# Patient Record
Sex: FEMALE | Race: BLACK OR AFRICAN AMERICAN | NOT HISPANIC OR LATINO
[De-identification: names, ages, dates, MRNs, and addresses within clinical notes are randomized per-mention and may not be internally consistent; named-entity substitution may affect disease eponyms.]

---

## 2020-09-29 PROBLEM — Z00.00 ENCOUNTER FOR PREVENTIVE HEALTH EXAMINATION: Status: ACTIVE | Noted: 2020-09-29

## 2020-09-30 ENCOUNTER — APPOINTMENT (OUTPATIENT)
Dept: ORTHOPEDIC SURGERY | Facility: CLINIC | Age: 40
End: 2020-09-30
Payer: COMMERCIAL

## 2020-09-30 VITALS — BODY MASS INDEX: 32.96 KG/M2 | HEIGHT: 67 IN | WEIGHT: 210 LBS

## 2020-09-30 DIAGNOSIS — M94.269 CHONDROMALACIA, UNSPECIFIED KNEE: ICD-10-CM

## 2020-09-30 PROCEDURE — 20610 DRAIN/INJ JOINT/BURSA W/O US: CPT | Mod: LT

## 2020-09-30 PROCEDURE — 99204 OFFICE O/P NEW MOD 45 MIN: CPT | Mod: 25

## 2020-09-30 PROCEDURE — 73562 X-RAY EXAM OF KNEE 3: CPT | Mod: LT

## 2020-09-30 NOTE — HISTORY OF PRESENT ILLNESS
[de-identified] : Location: Left knee\par Duration: July 2020\par Context: atraumatic\par Quality: sharp, constant\par Aggravating factors: walking, going downstairs worse than going upstairs\par Associated symptoms: popping/clicking, swelling\par Conservative treatment: NSAID, ice (only helps with swelling, not pain)\par Prior studies: saw an Ortho doctor 3 weeks ago but no x-rays - was given NSAID prescription

## 2020-09-30 NOTE — PROCEDURE
[de-identified] : Patient has demonstrated limited relief from NSAIDS, rest, exercises / PT, and after discussion of the risks and benefits, the patient has elected to proceed with a corticosteroid injection into the LEFT knee via an Anterolateral site.\par Confirmed that the patient does not have history of prior adverse reactions, active, infections, or relevant allergies.   There was no erythema or warmth, and the skin was clear.  The skin was sterilized with alcohol and via sterile technique, the knee was injected 3 cc of 1% xylocaine with 5 mg Kenalog.  The injection was completed without complication and a bandage was applied.  The patient tolerated the procedure well and was given post-injection instructions.\par

## 2020-09-30 NOTE — ASSESSMENT
[FreeTextEntry1] : Discussed at length with patient exam history and imaging.  Recommendation for home exercises and avoid hyperflexion in the past 90°.  Offered physical therapy but patient declines.  Patient elects cortisone injection today.  If no improvement in 5-6 weeks patient to follow up in office\par

## 2020-09-30 NOTE — PHYSICAL EXAM
[de-identified] : Left knee\par \par Constitutional: \par The patient is healthy-appearing and in no apparent distress. \par \par Gait:\par The patient ambulates with a normal gait and no limp.\par \par Cardiovascular System: \par The capillary refill is less than 2 seconds. \par \par Skin: \par There are no skin abnormalities.\par \par Left Knee: \par \par Bony Palpation: \par There is no tenderness of the medial joint line. \par There is no tenderness of the lateral joint line.\par There is no tenderness of the medial femoral chondyle.\par There is no tenderness of the lateral femoral chondyle.\par There is no tenderness of the tibial tubercle.\par There is no tenderness of the superior patella.\par There is no tenderness of the inferior patella.\par There is tenderness of the medial patellar facet.\par There is tenderness of the lateral patellar facet.\par \par Soft Tissue Palpation: \par There is no tenderness of the medial retinaculum.\par There is no tenderness of the lateral retinaculum.\par There is no tenderness of the quadriceps tendon.\par There is no tenderness of the patella tendon.\par There is no tenderness of the ITB.\par There is no tenderness of the pes anserine.\par \par Active Range of Motion: \par The range of motion at the knee actively and passively is full. \par \par Special Tests: \par There is a negative Apley.\par There is a negative Steinmanns. \par There is a negative Lachman and Anterior Drawer.\par There is a negative Posterior Drawer.  \par There is no varus or valgus laxity.\par \par Strength: \par There is 4/5 hip flexion and 5/5 knee flexion and extension.  \par \par Psychiatric: \par The patient demonstrates a normal mood and affect and is active and alert.\par  [de-identified] : X-ray left knee.  There is mild patellofemoral arthritis with trace medial compartment narrowing

## 2020-10-13 ENCOUNTER — APPOINTMENT (OUTPATIENT)
Dept: ORTHOPEDIC SURGERY | Facility: CLINIC | Age: 40
End: 2020-10-13

## 2020-10-29 ENCOUNTER — APPOINTMENT (OUTPATIENT)
Dept: ORTHOPEDIC SURGERY | Facility: CLINIC | Age: 40
End: 2020-10-29

## 2020-11-12 ENCOUNTER — RESULT REVIEW (OUTPATIENT)
Age: 40
End: 2020-11-12

## 2021-02-03 ENCOUNTER — APPOINTMENT (OUTPATIENT)
Dept: ORTHOPEDIC SURGERY | Facility: CLINIC | Age: 41
End: 2021-02-03

## 2021-02-10 ENCOUNTER — APPOINTMENT (OUTPATIENT)
Dept: ORTHOPEDIC SURGERY | Facility: CLINIC | Age: 41
End: 2021-02-10
Payer: COMMERCIAL

## 2021-02-10 PROCEDURE — 72050 X-RAY EXAM NECK SPINE 4/5VWS: CPT

## 2021-02-10 PROCEDURE — 99072 ADDL SUPL MATRL&STAF TM PHE: CPT

## 2021-02-10 PROCEDURE — 99214 OFFICE O/P EST MOD 30 MIN: CPT

## 2021-02-10 RX ORDER — DICLOFENAC SODIUM 75 MG/1
75 TABLET, DELAYED RELEASE ORAL
Qty: 60 | Refills: 0 | Status: ACTIVE | COMMUNITY
Start: 2021-02-10 | End: 1900-01-01

## 2021-02-10 NOTE — ASSESSMENT
[FreeTextEntry1] : 40 year old female with chronic neck pain and cervical radiculopathy. She has paresthesias in the upper extremities but is otherwise neurologically intact.  She has tried physical therapy, trigger point injections, medications and chiropractic care in the past without relief.  She has not had an MRI since 2019 and her symptoms have worsened.  She will be sent for a new MRI.  She was sent a prescription for Diclofenac.  She will send us a copy of her EMG/NCS.  She will followup once her MRI has been completed.  She knows to call with any questions or concerns or if her symptoms acutely worsen.

## 2021-02-10 NOTE — HISTORY OF PRESENT ILLNESS
[de-identified] : 40 year old female presents with chronic neck pain radiating into her upper extremities left worse than right.  She reports paresthesias into the upper extremities as well.  She has tried multiple treatments for her symptoms.  She has been doing physical therapy without relief.  She has tried trigger point injections the last of which were in Dec 2020 and only provided a few days of relief.  She has tried chiropractic care.  She has tried numerous medications however she does not have a list of these medications and cannot recall the names.  She also had a NCS/EMG within the last year.  She says the results of that study were normal but she does not have the records.  She had a MRI in 2019.  She denies fine motor deficits, recent illness, fevers, weakness, balance problems, saddle anesthesia, urinary retention or fecal incontinence.

## 2021-02-10 NOTE — PHYSICAL EXAM
[de-identified] : General: No acute distress, conversant, well-nourished.\par Head: Normocephalic, atraumatic\par Neck: trachea midline, FROM\par Heart: normotensive and normal rate and rhythm\par Lungs: No labored breathing\par Skin: No abrasions, no rashes, no edema\par Psych: Alert and oriented to person, place and time\par Extremities: no peripheral edema or digital cyanosis\par Gait: Normal gait. Can perform tandem gait.  \par Vascular: warm and well perfused distally, palpable distal pulses\par \par MSK:\par Cervical Spine: \par + tenderness to palpation along posterior neck.  No step-off, no deformity.\par \par NEURO:\par Sensation \par          Left           \par C5     2/2               \par C6     2/2               \par C7     2/2               \par C8     2/2              \par T1     2/2             \par \par          Right         \par C5     2/2               \par C6     2/2               \par C7     2/2               \par C8     2/2              \par T1     2/2      \par \par Motor: \par                                                Left             \par C5 (deltoid abduction)             5/5               \par C6 (biceps flexion)                   5/5                \par C7 (triceps extension)             5/5               \par C8 (finger flexion)                     5/5               \par T1 (interosseous)                     5/5           \par \par                                                Right           \par C5 (deltoid abduction)             5/5               \par C6 (biceps flexion)                   5/5                \par C7 (triceps extension)             5/5               \par C8 (finger flexion)                     5/5               \par T1 (interosseous)                     5/5                     \par \par Sensation \par Left L2  -  2/2            \par Left L3  -  2/2\par Left L4  -  2/2\par Left L5  -  2/2\par Left S1  -  2/2\par \par Right L2  -  2/2            \par Right L3  -  2/2\par Right L4  -  2/2\par Right L5  -  2/2\par Right S1  -  2/2\par \par Motor: \par Left L2 (hip flexion)                            5/5                \par Left L3 (knee extension)                   5/5                \par Left L4 (ankle dorsiflexion)                 5/5                \par Left L5 (long toe extensor)                5/5                \par Left S1 (ankle plantar flexion)           5/5\par \par Right L2 (hip flexion)                            5/5                \par Right L3 (knee extension)                   5/5                \par Right L4 (ankle dorsiflexion)                 5/5                \par Right L5 (long toe extensor)                5/5                \par Right S1 (ankle plantar flexion)           5/5\par \par Reflexes: Normal and symmetric\par Negative Spurling’s test.  Negative Victor’s reflex.  \par Negative clonus.  Down-going Babinski. [de-identified] : Cervical AP, lateral, flexion and extension radiographs obtained in the office today shows no fracture or dislocation.  Cervical spondylosis.  Severe loss of disc height at C5-C6 with anterior osteophyte formation.  No instability on dynamic series. \par \par Cervical MRI (4/10/19): Central C5-6 disc herniation with extrusion resulting in moderate central stenosis. Central C7 disc herniation resulting in mild central stenosis.  Bulging C3-4 and C4-5 resulting in mild central stenosis. Evidence for muscle spasm with straightening and slight reversal of the normal cervical lordosis.

## 2021-02-10 NOTE — CONSULT LETTER
[Dear  ___] : Dear  [unfilled], [Consult Letter:] : I had the pleasure of evaluating your patient, [unfilled]. [Please see my note below.] : Please see my note below. [Sincerely,] : Sincerely, [FreeTextEntry3] : Lázaro June MD\par Spine Surgeon\par  of Orthopedics\par [facility]\par \par Office: [address] [place] [address] [address] [place] [address]\par Phone: [phone]\par Fax: [phone]

## 2021-02-18 ENCOUNTER — APPOINTMENT (OUTPATIENT)
Dept: ORTHOPEDIC SURGERY | Facility: CLINIC | Age: 41
End: 2021-02-18
Payer: COMMERCIAL

## 2021-02-18 VITALS — TEMPERATURE: 99.3 F

## 2021-02-18 PROCEDURE — 99072 ADDL SUPL MATRL&STAF TM PHE: CPT

## 2021-02-18 PROCEDURE — 99214 OFFICE O/P EST MOD 30 MIN: CPT

## 2021-02-18 NOTE — PHYSICAL EXAM
[de-identified] : General: No acute distress, conversant, well-nourished.\par Head: Normocephalic, atraumatic\par Neck: trachea midline, FROM\par Heart: normotensive and normal rate and rhythm\par Lungs: No labored breathing\par Skin: No abrasions, no rashes, no edema\par Psych: Alert and oriented to person, place and time\par Extremities: no peripheral edema or digital cyanosis\par Gait: Normal gait. Can perform tandem gait.  \par Vascular: warm and well perfused distally, palpable distal pulses\par \par MSK:\par Cervical Spine: \par + tenderness to palpation along posterior neck.  No step-off, no deformity.\par \par NEURO:\par Sensation \par          Left           \par C5     2/2               \par C6     2/2               \par C7     2/2               \par C8     2/2              \par T1     2/2             \par \par          Right         \par C5     2/2               \par C6     2/2               \par C7     2/2               \par C8     2/2              \par T1     2/2      \par \par Motor: \par                                                Left             \par C5 (deltoid abduction)             5/5               \par C6 (biceps flexion)                   5/5                \par C7 (triceps extension)             5/5               \par C8 (finger flexion)                     5/5               \par T1 (interosseous)                     5/5           \par \par                                                Right           \par C5 (deltoid abduction)             5/5               \par C6 (biceps flexion)                   5/5                \par C7 (triceps extension)             5/5               \par C8 (finger flexion)                     5/5               \par T1 (interosseous)                     5/5                     \par \par Sensation \par Left L2  -  2/2            \par Left L3  -  2/2\par Left L4  -  2/2\par Left L5  -  2/2\par Left S1  -  2/2\par \par Right L2  -  2/2            \par Right L3  -  2/2\par Right L4  -  2/2\par Right L5  -  2/2\par Right S1  -  2/2\par \par Motor: \par Left L2 (hip flexion)                            5/5                \par Left L3 (knee extension)                   5/5                \par Left L4 (ankle dorsiflexion)                 5/5                \par Left L5 (long toe extensor)                5/5                \par Left S1 (ankle plantar flexion)           5/5\par \par Right L2 (hip flexion)                            5/5                \par Right L3 (knee extension)                   5/5                \par Right L4 (ankle dorsiflexion)                 5/5                \par Right L5 (long toe extensor)                5/5                \par Right S1 (ankle plantar flexion)           5/5\par \par Reflexes: Normal and symmetric\par Negative Spurling’s test.  Negative Victor’s reflex.  \par Negative clonus.  Down-going Babinski. [de-identified] : Cervical MRI (2/17/21): Large extruded central disc herniation at C5-6 with moderate cord compression. Disc herniation is increased in size since prior study. Interval development of small central disc herniation at C4-5 which abuts the anterior cord margin. Small central disc herniation C6-7 is unchanged. Mild annular bulge at C3-4 is unchanged.\par \par Cervical AP, lateral, flexion and extension radiographs (Feb 10, 2021) no fracture or dislocation.  Cervical spondylosis.  Severe loss of disc height at C5-C6 with anterior osteophyte formation.  No instability on dynamic series. \par \par Cervical MRI (4/10/19): Central C5-6 disc herniation with extrusion resulting in moderate central stenosis. Central C7 disc herniation resulting in mild central stenosis.  Bulging C3-4 and C4-5 resulting in mild central stenosis. Evidence for muscle spasm with straightening and slight reversal of the normal cervical lordosis.

## 2021-02-18 NOTE — HISTORY OF PRESENT ILLNESS
[de-identified] : 40 year old female returns for followup with chronic neck pain and cervical radiculopathy.  Her radicular symptoms are worse on the left with the pain radiating down to her lateral forearm and hand.  The right radicular pain goes to the shoulder and lateral forearm.  She continues to have paresthesias into the upper extremities. She says the Diclofenac only provides minimal relief.  She has been dong physical therapy without relief.  She is here to review her recent MRI.  She denies fine motor deficits, recent illness, fevers, weakness, balance problems, saddle anesthesia, urinary retention or fecal incontinence.

## 2021-02-18 NOTE — CONSULT LETTER
[Dear  ___] : Dear  [unfilled], [Consult Letter:] : I had the pleasure of evaluating your patient, [unfilled]. [Please see my note below.] : Please see my note below. [Sincerely,] : Sincerely, [FreeTextEntry3] : Lázaro June MD\par Spine Surgeon\par  of Orthopedics\par [facility] / [facility]\par \par Office: [address] [place] [address] [address] [place] [address]\par Phone: [phone]\par Fax: [phone]

## 2021-02-24 ENCOUNTER — NON-APPOINTMENT (OUTPATIENT)
Age: 41
End: 2021-02-24

## 2021-03-11 ENCOUNTER — APPOINTMENT (OUTPATIENT)
Dept: ORTHOPEDIC SURGERY | Facility: CLINIC | Age: 41
End: 2021-03-11
Payer: COMMERCIAL

## 2021-03-11 PROCEDURE — 99213 OFFICE O/P EST LOW 20 MIN: CPT | Mod: 95

## 2021-03-12 RX ORDER — POVIDONE-IODINE 5 %
1 AEROSOL (ML) TOPICAL ONCE
Refills: 0 | Status: COMPLETED | OUTPATIENT
Start: 2021-03-15 | End: 2021-03-15

## 2021-03-12 RX ORDER — INFLUENZA VIRUS VACCINE 15; 15; 15; 15 UG/.5ML; UG/.5ML; UG/.5ML; UG/.5ML
0.5 SUSPENSION INTRAMUSCULAR ONCE
Refills: 0 | Status: DISCONTINUED | OUTPATIENT
Start: 2021-03-15 | End: 2021-03-18

## 2021-03-12 NOTE — H&P ADULT - NSHPLABSRESULTS_GEN_ALL_CORE
3M DOS  Covid Swab pending 3M DOS  Covid Swab pending  preop cbc/cmp/pt/inr/ptt - within normal limits, reviewed by medical clearance   ekg - day of surgery   Cr. 0.76 3M DOS  preop cbc/cmp/pt/inr/ptt - within normal limits, reviewed by medical clearance   ekg - day of surgery if requested by anesthesia, ordered  Cr. 0.76  UA WNL

## 2021-03-12 NOTE — H&P ADULT - NSHPPHYSICALEXAM_GEN_ALL_CORE
MSK: + decreased ROM 2/2 pain, cervical spine     Remainder of exam per medical clearance note MSK: + decreased ROM 2/2 pain, cervical spine   MSK: AIN/PIN/U nerves intact to motor BUE. Wrist flex/ext intact BUE. Sensation intact to M/R/U/Msk/Ax nerves and equal BUE. Cap refill brisk. Skin warm and well perfused. Radial pulses palpable.  strength 5/5 BUE.  Remainder of exam per medical clearance note

## 2021-03-12 NOTE — H&P ADULT - PROBLEM SELECTOR PLAN 1
Admit to Orthopaedic Service.  Presents today for elective ACDF C5-C6  Pt medically stable and cleared for procedure today by  Admit to Orthopaedic Service.  Presents today for elective ACDF C5-C6  Pt medically stable and cleared for procedure today by Dr. Pandey

## 2021-03-12 NOTE — H&P ADULT - HISTORY OF PRESENT ILLNESS
41F c/o neck pain x       Present for elective ACF C5-C6  41F c/o neck pain x years without accident or injury to bring on pain. Pt endorses pain in arms R>L and associated tingling and pins and needles to L>R arm and sharp pain down LLE intermittently. Pain persists despite conservative measures. Pt is LHD. Ambulates with no assistance. Notes she at times has BLE tingling that was once relieved with a lumbar spine BRANDON but has worn off.  Denies recent illness, weakness, bowel or bladder changes.  Present for elective ACF C5-C6

## 2021-03-13 NOTE — PHYSICAL EXAM
[de-identified] : General: NAD AAOx4, well-appearing\par Respiratory: No visible respiratory distress\par Psych: Good mood and appropriate affect\par Skin: WWP, no rashes\par Gait: Normal gait, can do tandem gait\par MSK:\par Cervical: no visible deformity in coronal or sagittal planes. Patient indicates pain radiating from neck to bilateral upper extremities down to hands\par Neuro: patient describes numbness and paresthesias in her hands. Grossly moving all extremities [de-identified] : Cervical MRI (2/17/21): Large extruded central disc herniation at C5-6 with moderate cord compression. Disc herniation is increased in size since prior study. Interval development of small central disc herniation at C4-5 which abuts the anterior cord margin. Small central disc herniation C6-7 is unchanged. Mild annular bulge at C3-4 is unchanged.\par \par Cervical AP, lateral, flexion and extension radiographs (Feb 10, 2021) no fracture or dislocation.  Cervical spondylosis.  Severe loss of disc height at C5-C6 with anterior osteophyte formation.  No instability on dynamic series. \par \par Cervical MRI (4/10/19): Central C5-6 disc herniation with extrusion resulting in moderate central stenosis. Central C7 disc herniation resulting in mild central stenosis.  Bulging C3-4 and C4-5 resulting in mild central stenosis. Evidence for muscle spasm with straightening and slight reversal of the normal cervical lordosis.

## 2021-03-13 NOTE — HISTORY OF PRESENT ILLNESS
[de-identified] : This visit was provided by the [facility] telemedicine service.  This telehealth appointment was conducted using real-time 2-way audiovisual technology.  The patient Selina Montano was at her home during the time of the visit. The provider, Lázaro June was located at his office at [address] [place] [address] at the time of the visit.  The patient and Lázaro June participated in the telehealth encounter.  Verbal consent was given on March 11, 2021 by the patient.\par \par HPI:\par Telehealth via [facility] tele[facility] service: 20 minutes\par \par 41 year old female returns for followup with cervical radiculopathy secondary to C5-C6 disc herniation with cord compression.  Her symptoms remain unchanged since her last visit.  The pain radiates to her lateral forearm and hand left worse than right.  She has paresthesias.  She is here to review information for her upcoming scheduled ACDF C5-C6 next week.  She denies recent illness, fevers, weakness, balance problems, saddle anesthesia, urinary retention or fecal incontinence.\par

## 2021-03-13 NOTE — ASSESSMENT
[FreeTextEntry1] : 41 year old female with cervical radiculopathy secondary to C5-C6 disc herniation causing moderate spinal cord compression.  Her symptoms have remained unchanged depite conservative treatment.  We reviewed information regarding her upcoming surgery scheduled for next week: ACDF C5-C6.  We discussed the risks and benefits of surgery.  The risks include anesthesia, blood loss, need for blood transfusion, clots, stroke, myocardial infarct, chronic pain, loss of function, need for reoperation, hardware failure, nonunion, infection, durotomy, damage to neurovascular structures, and Covid-19. The patient understands the risks and elects to proceed with surgical intervention.  The patient asked excellent questions which were answered.  She is planned for surgery next week.  She will require a Covid19 test prior to surgery.  She knows to call with any questions or concerns or if her symptoms acutely worsen.

## 2021-03-14 ENCOUNTER — TRANSCRIPTION ENCOUNTER (OUTPATIENT)
Age: 41
End: 2021-03-14

## 2021-03-15 ENCOUNTER — APPOINTMENT (OUTPATIENT)
Dept: ORTHOPEDIC SURGERY | Facility: HOSPITAL | Age: 41
End: 2021-03-15

## 2021-03-15 ENCOUNTER — INPATIENT (INPATIENT)
Facility: HOSPITAL | Age: 41
LOS: 2 days | Discharge: HOME | DRG: 472 | End: 2021-03-18
Attending: STUDENT IN AN ORGANIZED HEALTH CARE EDUCATION/TRAINING PROGRAM | Admitting: STUDENT IN AN ORGANIZED HEALTH CARE EDUCATION/TRAINING PROGRAM
Payer: COMMERCIAL

## 2021-03-15 VITALS
SYSTOLIC BLOOD PRESSURE: 120 MMHG | HEART RATE: 78 BPM | HEIGHT: 67 IN | WEIGHT: 218.48 LBS | RESPIRATION RATE: 16 BRPM | DIASTOLIC BLOOD PRESSURE: 80 MMHG | TEMPERATURE: 98 F

## 2021-03-15 DIAGNOSIS — M54.12 RADICULOPATHY, CERVICAL REGION: ICD-10-CM

## 2021-03-15 DIAGNOSIS — Z98.890 OTHER SPECIFIED POSTPROCEDURAL STATES: Chronic | ICD-10-CM

## 2021-03-15 LAB
BLD GP AB SCN SERPL QL: NEGATIVE — SIGNIFICANT CHANGE UP
RH IG SCN BLD-IMP: POSITIVE — SIGNIFICANT CHANGE UP

## 2021-03-15 PROCEDURE — 22845 INSERT SPINE FIXATION DEVICE: CPT | Mod: 59

## 2021-03-15 PROCEDURE — 22853 INSJ BIOMECHANICAL DEVICE: CPT

## 2021-03-15 PROCEDURE — 22551 ARTHRD ANT NTRBDY CERVICAL: CPT

## 2021-03-15 PROCEDURE — 20937 SP BONE AGRFT MORSEL ADD-ON: CPT

## 2021-03-15 RX ORDER — SENNA PLUS 8.6 MG/1
2 TABLET ORAL AT BEDTIME
Refills: 0 | Status: DISCONTINUED | OUTPATIENT
Start: 2021-03-15 | End: 2021-03-18

## 2021-03-15 RX ORDER — ONDANSETRON 8 MG/1
4 TABLET, FILM COATED ORAL EVERY 6 HOURS
Refills: 0 | Status: DISCONTINUED | OUTPATIENT
Start: 2021-03-15 | End: 2021-03-18

## 2021-03-15 RX ORDER — PREGABALIN 225 MG/1
0 CAPSULE ORAL
Qty: 0 | Refills: 0 | DISCHARGE

## 2021-03-15 RX ORDER — OXYCODONE HYDROCHLORIDE 5 MG/1
5 TABLET ORAL EVERY 4 HOURS
Refills: 0 | Status: DISCONTINUED | OUTPATIENT
Start: 2021-03-15 | End: 2021-03-18

## 2021-03-15 RX ORDER — TRAMADOL HYDROCHLORIDE 50 MG/1
0 TABLET ORAL
Qty: 0 | Refills: 0 | DISCHARGE

## 2021-03-15 RX ORDER — CHLORHEXIDINE GLUCONATE 213 G/1000ML
1 SOLUTION TOPICAL ONCE
Refills: 0 | Status: COMPLETED | OUTPATIENT
Start: 2021-03-15 | End: 2021-03-15

## 2021-03-15 RX ORDER — CEFAZOLIN SODIUM 1 G
2000 VIAL (EA) INJECTION EVERY 8 HOURS
Refills: 0 | Status: COMPLETED | OUTPATIENT
Start: 2021-03-15 | End: 2021-03-16

## 2021-03-15 RX ORDER — SODIUM CHLORIDE 9 MG/ML
1000 INJECTION, SOLUTION INTRAVENOUS
Refills: 0 | Status: DISCONTINUED | OUTPATIENT
Start: 2021-03-15 | End: 2021-03-18

## 2021-03-15 RX ORDER — GABAPENTIN 400 MG/1
300 CAPSULE ORAL ONCE
Refills: 0 | Status: COMPLETED | OUTPATIENT
Start: 2021-03-15 | End: 2021-03-15

## 2021-03-15 RX ORDER — MAGNESIUM HYDROXIDE 400 MG/1
30 TABLET, CHEWABLE ORAL EVERY 12 HOURS
Refills: 0 | Status: DISCONTINUED | OUTPATIENT
Start: 2021-03-15 | End: 2021-03-18

## 2021-03-15 RX ORDER — ACETAMINOPHEN 500 MG
1000 TABLET ORAL EVERY 8 HOURS
Refills: 0 | Status: DISCONTINUED | OUTPATIENT
Start: 2021-03-15 | End: 2021-03-18

## 2021-03-15 RX ORDER — METHOCARBAMOL 500 MG/1
500 TABLET, FILM COATED ORAL EVERY 8 HOURS
Refills: 0 | Status: DISCONTINUED | OUTPATIENT
Start: 2021-03-15 | End: 2021-03-18

## 2021-03-15 RX ORDER — HYDROMORPHONE HYDROCHLORIDE 2 MG/ML
0.5 INJECTION INTRAMUSCULAR; INTRAVENOUS; SUBCUTANEOUS
Refills: 0 | Status: DISCONTINUED | OUTPATIENT
Start: 2021-03-15 | End: 2021-03-18

## 2021-03-15 RX ORDER — CHOLECALCIFEROL (VITAMIN D3) 125 MCG
0 CAPSULE ORAL
Qty: 0 | Refills: 0 | DISCHARGE

## 2021-03-15 RX ORDER — METOCLOPRAMIDE HCL 10 MG
10 TABLET ORAL EVERY 8 HOURS
Refills: 0 | Status: DISCONTINUED | OUTPATIENT
Start: 2021-03-15 | End: 2021-03-18

## 2021-03-15 RX ORDER — ACETAMINOPHEN 500 MG
1000 TABLET ORAL ONCE
Refills: 0 | Status: COMPLETED | OUTPATIENT
Start: 2021-03-15 | End: 2021-03-15

## 2021-03-15 RX ORDER — APREPITANT 80 MG/1
40 CAPSULE ORAL ONCE
Refills: 0 | Status: COMPLETED | OUTPATIENT
Start: 2021-03-15 | End: 2021-03-15

## 2021-03-15 RX ORDER — BENZOCAINE AND MENTHOL 5; 1 G/100ML; G/100ML
1 LIQUID ORAL EVERY 6 HOURS
Refills: 0 | Status: DISCONTINUED | OUTPATIENT
Start: 2021-03-15 | End: 2021-03-18

## 2021-03-15 RX ADMIN — Medication 1000 MILLIGRAM(S): at 22:30

## 2021-03-15 RX ADMIN — Medication 1000 MILLIGRAM(S): at 10:37

## 2021-03-15 RX ADMIN — APREPITANT 40 MILLIGRAM(S): 80 CAPSULE ORAL at 10:37

## 2021-03-15 RX ADMIN — SENNA PLUS 2 TABLET(S): 8.6 TABLET ORAL at 21:38

## 2021-03-15 RX ADMIN — CHLORHEXIDINE GLUCONATE 1 APPLICATION(S): 213 SOLUTION TOPICAL at 10:47

## 2021-03-15 RX ADMIN — Medication 1000 MILLIGRAM(S): at 21:37

## 2021-03-15 RX ADMIN — METHOCARBAMOL 500 MILLIGRAM(S): 500 TABLET, FILM COATED ORAL at 21:38

## 2021-03-15 RX ADMIN — GABAPENTIN 300 MILLIGRAM(S): 400 CAPSULE ORAL at 10:37

## 2021-03-15 RX ADMIN — Medication 100 MILLIGRAM(S): at 19:48

## 2021-03-15 RX ADMIN — BENZOCAINE AND MENTHOL 1 LOZENGE: 5; 1 LIQUID ORAL at 19:49

## 2021-03-15 RX ADMIN — Medication 1 APPLICATION(S): at 10:40

## 2021-03-15 RX ADMIN — ONDANSETRON 4 MILLIGRAM(S): 8 TABLET, FILM COATED ORAL at 19:48

## 2021-03-15 RX ADMIN — Medication 50 MILLIGRAM(S): at 21:38

## 2021-03-15 NOTE — BRIEF OPERATIVE NOTE - NSICDXBRIEFPREOP_GEN_ALL_CORE_FT
PRE-OP DIAGNOSIS:  Cervical radiculopathy 15-Mar-2021 15:15:34 Cervical radiculopathy Caro Alvarez

## 2021-03-15 NOTE — PROGRESS NOTE ADULT - SUBJECTIVE AND OBJECTIVE BOX
Orthopaedics Post Op Check    Procedure:  ACDF C5-C6 with ICBG  Surgeon: Dr. June     Pt comfortable, without complaints  Denies CP, SOB, N/V, numbness/tingling     Vital Signs Last 24 Hrs  T(C): 36.1 (15 Mar 2021 15:10), Max: 36.6 (15 Mar 2021 09:57)  T(F): 97 (15 Mar 2021 15:10), Max: 97.9 (15 Mar 2021 09:57)  HR: 71 (15 Mar 2021 15:55) (68 - 82)  BP: 159/89 (15 Mar 2021 15:55) (120/80 - 168/85)  BP(mean): 119 (15 Mar 2021 15:55) (116 - 121)  RR: 15 (15 Mar 2021 15:55) (11 - 16)  SpO2: 100% (15 Mar 2021 15:55) (100% - 100%)  AVSS, NAD    Dressing C/D/I; hard collar in place, JON x 1   General: Pt Alert and oriented     Sensation intact to bilateral UE distally. Motor Strength 5/5 to /interossei bilaterally. AIN/PIN intact.   Skin warm and well perfused   Radial pulses 2+         Post op XR: fluoroscopy utilized intra op to confirm level     A/P: 41yFemale POD#0 s/p ACDF C5-C6, ICBG  - Stable  - Pain Control  - DVT ppx: SCDs  - Post op abx: Ancef  - PT, WBS: WBAT  - F/U AM Labs

## 2021-03-15 NOTE — BRIEF OPERATIVE NOTE - NSICDXBRIEFPROCEDURE_GEN_ALL_CORE_FT
PROCEDURES:  Bone graft of left iliac crest 15-Mar-2021 15:17:14  Caro Alvarez  Anterior cervical discectomy and fusion (ACDF) 15-Mar-2021 15:16:04  Caro Alvarez

## 2021-03-15 NOTE — BRIEF OPERATIVE NOTE - NSICDXBRIEFPOSTOP_GEN_ALL_CORE_FT
POST-OP DIAGNOSIS:  Cervical radiculopathy 15-Mar-2021 15:15:46 Cervical radiculopathy Caro Alvarez

## 2021-03-15 NOTE — PRE-OP CHECKLIST - SKIN PREP
October 9, 2020     Finesse Aguirre                                                                                          [address] [facility] [id number]        Dear Shoam Kilpatrick wrote a referral for you to see a gastroenterologist  specialist. Our department has not been able to reach you by phone.    Your health is important to us.  If you are still interested in receiving services with a gastroenterologist  specialist, please give us a call to schedule your appointment at [phone].     Please know as we continue to manage COVID-19, your health and safety is our highest priority. The [facility] provides additional measures to protect you and our team members. In person or online, we're here for you.    We look forward to providing you with the best care possible.     Sincerely,    Majo Jerome MD    [facility] [address] [facility] [address]  Dept Phone: [phone]       done

## 2021-03-16 ENCOUNTER — TRANSCRIPTION ENCOUNTER (OUTPATIENT)
Age: 41
End: 2021-03-16

## 2021-03-16 LAB
ANION GAP SERPL CALC-SCNC: 9 MMOL/L — SIGNIFICANT CHANGE UP (ref 5–17)
BASOPHILS # BLD AUTO: 0.01 K/UL — SIGNIFICANT CHANGE UP (ref 0–0.2)
BASOPHILS NFR BLD AUTO: 0.1 % — SIGNIFICANT CHANGE UP (ref 0–2)
BUN SERPL-MCNC: 7 MG/DL — SIGNIFICANT CHANGE UP (ref 7–23)
CALCIUM SERPL-MCNC: 8.7 MG/DL — SIGNIFICANT CHANGE UP (ref 8.4–10.5)
CHLORIDE SERPL-SCNC: 107 MMOL/L — SIGNIFICANT CHANGE UP (ref 96–108)
CO2 SERPL-SCNC: 26 MMOL/L — SIGNIFICANT CHANGE UP (ref 22–31)
CREAT SERPL-MCNC: 0.78 MG/DL — SIGNIFICANT CHANGE UP (ref 0.5–1.3)
EOSINOPHIL # BLD AUTO: 0 K/UL — SIGNIFICANT CHANGE UP (ref 0–0.5)
EOSINOPHIL NFR BLD AUTO: 0 % — SIGNIFICANT CHANGE UP (ref 0–6)
GLUCOSE SERPL-MCNC: 102 MG/DL — HIGH (ref 70–99)
HCT VFR BLD CALC: 34.9 % — SIGNIFICANT CHANGE UP (ref 34.5–45)
HGB BLD-MCNC: 11 G/DL — LOW (ref 11.5–15.5)
IMM GRANULOCYTES NFR BLD AUTO: 0.3 % — SIGNIFICANT CHANGE UP (ref 0–1.5)
LYMPHOCYTES # BLD AUTO: 1.15 K/UL — SIGNIFICANT CHANGE UP (ref 1–3.3)
LYMPHOCYTES # BLD AUTO: 12.7 % — LOW (ref 13–44)
MCHC RBC-ENTMCNC: 28.4 PG — SIGNIFICANT CHANGE UP (ref 27–34)
MCHC RBC-ENTMCNC: 31.5 GM/DL — LOW (ref 32–36)
MCV RBC AUTO: 90.2 FL — SIGNIFICANT CHANGE UP (ref 80–100)
MONOCYTES # BLD AUTO: 0.49 K/UL — SIGNIFICANT CHANGE UP (ref 0–0.9)
MONOCYTES NFR BLD AUTO: 5.4 % — SIGNIFICANT CHANGE UP (ref 2–14)
NEUTROPHILS # BLD AUTO: 7.41 K/UL — HIGH (ref 1.8–7.4)
NEUTROPHILS NFR BLD AUTO: 81.5 % — HIGH (ref 43–77)
NRBC # BLD: 0 /100 WBCS — SIGNIFICANT CHANGE UP (ref 0–0)
PLATELET # BLD AUTO: 301 K/UL — SIGNIFICANT CHANGE UP (ref 150–400)
POTASSIUM SERPL-MCNC: 4.4 MMOL/L — SIGNIFICANT CHANGE UP (ref 3.5–5.3)
POTASSIUM SERPL-SCNC: 4.4 MMOL/L — SIGNIFICANT CHANGE UP (ref 3.5–5.3)
RBC # BLD: 3.87 M/UL — SIGNIFICANT CHANGE UP (ref 3.8–5.2)
RBC # FLD: 15.1 % — HIGH (ref 10.3–14.5)
SODIUM SERPL-SCNC: 142 MMOL/L — SIGNIFICANT CHANGE UP (ref 135–145)
WBC # BLD: 9.09 K/UL — SIGNIFICANT CHANGE UP (ref 3.8–10.5)
WBC # FLD AUTO: 9.09 K/UL — SIGNIFICANT CHANGE UP (ref 3.8–10.5)

## 2021-03-16 PROCEDURE — 22845 INSERT SPINE FIXATION DEVICE: CPT | Mod: AS,59

## 2021-03-16 PROCEDURE — 20937 SP BONE AGRFT MORSEL ADD-ON: CPT | Mod: AS

## 2021-03-16 PROCEDURE — 22853 INSJ BIOMECHANICAL DEVICE: CPT | Mod: AS

## 2021-03-16 PROCEDURE — 72040 X-RAY EXAM NECK SPINE 2-3 VW: CPT | Mod: 26

## 2021-03-16 PROCEDURE — 22551 ARTHRD ANT NTRBDY CERVICAL: CPT | Mod: AS

## 2021-03-16 RX ORDER — SODIUM CHLORIDE 9 MG/ML
500 INJECTION INTRAMUSCULAR; INTRAVENOUS; SUBCUTANEOUS ONCE
Refills: 0 | Status: DISCONTINUED | OUTPATIENT
Start: 2021-03-16 | End: 2021-03-16

## 2021-03-16 RX ORDER — SODIUM CHLORIDE 9 MG/ML
500 INJECTION INTRAMUSCULAR; INTRAVENOUS; SUBCUTANEOUS ONCE
Refills: 0 | Status: COMPLETED | OUTPATIENT
Start: 2021-03-16 | End: 2021-03-16

## 2021-03-16 RX ORDER — DEXAMETHASONE 0.5 MG/5ML
8 ELIXIR ORAL EVERY 8 HOURS
Refills: 0 | Status: COMPLETED | OUTPATIENT
Start: 2021-03-16 | End: 2021-03-17

## 2021-03-16 RX ADMIN — Medication 100 MILLIGRAM(S): at 06:28

## 2021-03-16 RX ADMIN — SODIUM CHLORIDE 100 MILLILITER(S): 9 INJECTION, SOLUTION INTRAVENOUS at 11:14

## 2021-03-16 RX ADMIN — SODIUM CHLORIDE 500 MILLILITER(S): 9 INJECTION INTRAMUSCULAR; INTRAVENOUS; SUBCUTANEOUS at 12:35

## 2021-03-16 RX ADMIN — Medication 101.6 MILLIGRAM(S): at 13:50

## 2021-03-16 RX ADMIN — SENNA PLUS 2 TABLET(S): 8.6 TABLET ORAL at 21:44

## 2021-03-16 RX ADMIN — Medication 1000 MILLIGRAM(S): at 06:43

## 2021-03-16 RX ADMIN — OXYCODONE HYDROCHLORIDE 5 MILLIGRAM(S): 5 TABLET ORAL at 11:14

## 2021-03-16 RX ADMIN — Medication 1000 MILLIGRAM(S): at 06:15

## 2021-03-16 RX ADMIN — Medication 1000 MILLIGRAM(S): at 21:43

## 2021-03-16 RX ADMIN — Medication 50 MILLIGRAM(S): at 06:15

## 2021-03-16 RX ADMIN — METHOCARBAMOL 500 MILLIGRAM(S): 500 TABLET, FILM COATED ORAL at 13:52

## 2021-03-16 RX ADMIN — ONDANSETRON 4 MILLIGRAM(S): 8 TABLET, FILM COATED ORAL at 00:56

## 2021-03-16 RX ADMIN — Medication 101.6 MILLIGRAM(S): at 21:28

## 2021-03-16 RX ADMIN — Medication 50 MILLIGRAM(S): at 21:44

## 2021-03-16 RX ADMIN — Medication 1000 MILLIGRAM(S): at 22:43

## 2021-03-16 RX ADMIN — METHOCARBAMOL 500 MILLIGRAM(S): 500 TABLET, FILM COATED ORAL at 21:44

## 2021-03-16 RX ADMIN — ONDANSETRON 4 MILLIGRAM(S): 8 TABLET, FILM COATED ORAL at 17:37

## 2021-03-16 RX ADMIN — BENZOCAINE AND MENTHOL 1 LOZENGE: 5; 1 LIQUID ORAL at 09:44

## 2021-03-16 RX ADMIN — Medication 1000 MILLIGRAM(S): at 13:52

## 2021-03-16 RX ADMIN — Medication 1000 MILLIGRAM(S): at 14:52

## 2021-03-16 RX ADMIN — METHOCARBAMOL 500 MILLIGRAM(S): 500 TABLET, FILM COATED ORAL at 06:15

## 2021-03-16 RX ADMIN — Medication 50 MILLIGRAM(S): at 13:52

## 2021-03-16 RX ADMIN — ONDANSETRON 4 MILLIGRAM(S): 8 TABLET, FILM COATED ORAL at 11:14

## 2021-03-16 RX ADMIN — OXYCODONE HYDROCHLORIDE 5 MILLIGRAM(S): 5 TABLET ORAL at 12:14

## 2021-03-16 RX ADMIN — ONDANSETRON 4 MILLIGRAM(S): 8 TABLET, FILM COATED ORAL at 06:15

## 2021-03-16 NOTE — PROGRESS NOTE ADULT - SUBJECTIVE AND OBJECTIVE BOX
Orthopaedic Surgery Progress Note    Post-operative day #1 s/p ACDF C5-C6    Subjective:     Patient seen and examined. She just finished OT and felt very dizzy. She is having some dysphagia. Denies chest pain, shortness of breath, nausea/vomiting, numbness/tingling.    Objective:    Vital Signs Last 24 Hrs  T(C): 36.2 (03-16-21 @ 09:15), Max: 36.9 (03-16-21 @ 06:29)  T(F): 97.1 (03-16-21 @ 09:15), Max: 98.5 (03-16-21 @ 06:29)  HR: 70 (03-16-21 @ 09:15) (70 - 82)  BP: 124/76 (03-16-21 @ 09:15) (124/76 - 133/63)  BP(mean): 90 (03-16-21 @ 06:29) (90 - 90)  RR: 16 (03-16-21 @ 09:15) (15 - 16)  SpO2: 100% (03-16-21 @ 09:15) (100% - 100%)  AVSS    PE:  General: Patient alert and oriented, NAD  Dressing: Clean/dry/intact anterior cervical and left hip   Pulses: 2+ radial pulse BUE   Sensation: SILT BUE   Motor: /biceps/triceps 5/5 BUE                           11.0   9.09  )-----------( 301      ( 16 Mar 2021 09:09 )             34.9     03-16    142  |  107  |  7   ----------------------------<  102<H>  4.4   |  26  |  0.78    Ca    8.7      16 Mar 2021 09:09    I&O's Detail    15 Mar 2021 07:01  -  16 Mar 2021 07:00  --------------------------------------------------------  IN:    IV PiggyBack: 50 mL    Lactated Ringers: 1400 mL    Oral Fluid: 100 mL  Total IN: 1550 mL    OUT:    Bulb (mL): 7.5 mL    Estimated Blood Loss (mL): 25 mL    Indwelling Catheter - Urethral (mL): 2600 mL  Total OUT: 2632.5 mL    Total NET: -1082.5 mL      16 Mar 2021 07:01  -  16 Mar 2021 11:47  --------------------------------------------------------  IN:    Lactated Ringers: 400 mL    Oral Fluid: 100 mL  Total IN: 500 mL    OUT:    Bulb (mL): 15 mL    Indwelling Catheter - Urethral (mL): 300 mL  Total OUT: 315 mL    Total NET: 185 mL    A/P: 42yo Female POD#1 s/p ACDF C5-C6    1. Pain control as needed  2. DVT prophylaxis: SCDs   3. OT/PT, weight-bearing status: WBAT   4. Remove tinoco. TOV   5. Dispo: Home    Ortho Pager [phone]

## 2021-03-16 NOTE — DISCHARGE NOTE PROVIDER - CARE PROVIDER_API CALL
Lázaro June)  [facility]  [address] [place] [address], [address] [place] [address]  Phone: [phone]  Fax: [phone]  Follow Up Time:

## 2021-03-16 NOTE — OCCUPATIONAL THERAPY INITIAL EVALUATION ADULT - IMPAIRMENTS CONTRIBUTING IMPAIRED BED MOBILITY, REHAB EVAL
impaired balance/impaired coordination/decreased flexibility/impaired motor control/impaired postural control/decreased strength

## 2021-03-16 NOTE — OCCUPATIONAL THERAPY INITIAL EVALUATION ADULT - DIAGNOSIS, OT EVAL
Patient c/o of dizziness and neck pain 6/10. Patient presented with decreased activity tolerance, BUE/BLE M/S, core strength, coordination, static/dynamic standing balance and safety awareness impacting independence with functional activities.

## 2021-03-16 NOTE — PROGRESS NOTE ADULT - SUBJECTIVE AND OBJECTIVE BOX
Procedure:  ACDF C5-C6 with ICBG  Surgeon: Dr. June     Pt comfortable, c/o some pain. Denies CP, SOB, N/V, numbness/tingling     Vital Signs Last 24 Hrs  T(C): 36.2 (16 Mar 2021 00:36), Max: 36.6 (15 Mar 2021 09:57)  T(F): 97.2 (16 Mar 2021 00:36), Max: 97.9 (15 Mar 2021 09:57)  HR: 81 (16 Mar 2021 00:36) (68 - 82)  BP: 130/64 (16 Mar 2021 00:36) (120/80 - 168/85)  BP(mean): 91 (16 Mar 2021 00:36) (91 - 121)  RR: 15 (16 Mar 2021 00:36) (11 - 16)  SpO2: 100% (16 Mar 2021 00:36) (100% - 100%)    Dressing C/D/I; hard collar in place, JON x 1   General: Pt Alert and oriented   Sensation intact to bilateral UE distally. Motor Strength 5/5 to /interossei bilaterally. AIN/PIN/U motor intact.   Skin warm and well perfused   Radial pulses 2+         Post op XR: fluoroscopy utilized intra op to confirm level     A/P: 41yFemale POD#1 s/p ACDF C5-C6, ICBG 3/15  - Stable  - Pain Control  - DVT ppx: SCDs  - Post op abx: Ancef  - Monitor drain o/p  - PT, WBS: WBAT  - F/U AM Labs

## 2021-03-16 NOTE — OCCUPATIONAL THERAPY INITIAL EVALUATION ADULT - GENERAL OBSERVATIONS, REHAB EVAL
Patient POD #1 s/p ACDF C5-C6. Patient A&Ox4, received seated at EOB +[place] J collar, +Heplock IV, +tinoco cath, room air, NAD.

## 2021-03-16 NOTE — DISCHARGE NOTE PROVIDER - HOSPITAL COURSE
Admit to Orthopaedics for ACDF C5-C6   Perioperative Antibiotics  DVT prophylaxis  Physical Therapy  Pain Management

## 2021-03-16 NOTE — DISCHARGE NOTE PROVIDER - NSDCMRMEDTOKEN_GEN_ALL_CORE_FT
Travis J: s/p cervical spine surgery  Multi Vitamin+:   traMADol 50 mg oral tablet: 1 tab(s) orally every 6 hours, As Needed  Vitamin B12:   Vitamin D3:    acetaminophen 500 mg oral tablet: 2 tab(s) orally every 8 hours x 1 week  Lyrica 50 mg oral capsule: 1 cap(s) orally 3 times a day MDD:3  methocarbamol 500 mg oral tablet: 1 tab(s) orally every 8 hours as needed for muscle spasms  [place] J: s/p cervical spine surgery  Multi Vitamin+:   oxyCODONE 5 mg oral tablet: 1-2 tab(s) orally every 4-6 hours as needed for moderate to severe pain MDD:6  Vitamin B12:   Vitamin D3:

## 2021-03-16 NOTE — PHYSICAL THERAPY INITIAL EVALUATION ADULT - GENERAL OBSERVATIONS, REHAB EVAL
As per Imelda GONZALEZ, patient cleared for PT/OOB. Received supine + JON drain, [place] J collar, IV, gaize anterior neck C,D,I, in NAD.

## 2021-03-16 NOTE — PROGRESS NOTE ADULT - ATTENDING COMMENTS
I have personally seen, examined and participated in the care of this patient.  I have reviewed all pertinent clinical information, including history, physical exam, plan and the resident's note.  Selina Montano is a 41 year old female s/p ACDF C5-C6.  Patient is neurologically intact.  Surgical site pain controlled. Mobilize with PT/OT.  [place]AL at all times with exception of eating and cleaning.  Radiographs reviewed and hardware in appropriate position and alignment.  Dispo planning.

## 2021-03-16 NOTE — DISCHARGE NOTE PROVIDER - NSDCCPCAREPLAN_GEN_ALL_CORE_FT
PRINCIPAL DISCHARGE DIAGNOSIS  Diagnosis: Cervical radiculopathy  Assessment and Plan of Treatment: Cervical radiculopathy

## 2021-03-16 NOTE — OCCUPATIONAL THERAPY INITIAL EVALUATION ADULT - MD ORDER
41F c/o neck pain x years without accident or injury to bring on pain. Pt endorses pain in arms R>L and associated tingling and pins and needles to L>R arm and sharp pain down LLE intermittently. Pain persists despite conservative measures. Pt is LHD. Ambulates with no assistance. Notes she at times has BLE tingling that was once relieved with a lumbar spine BRANDON but has worn off.  Denies recent illness, weakness, bowel or bladder changes.  Present for elective ACF C5-C6 on 3/15/21.

## 2021-03-16 NOTE — PHYSICAL THERAPY INITIAL EVALUATION ADULT - PERTINENT HX OF CURRENT PROBLEM, REHAB EVAL
41F c/o neck pain x years without accident or injury to bring on pain. Pt endorses pain in arms R>L and associated tingling and pins and needles to L>R arm and sharp pain down LLE intermittently. Pain persists despite conservative measures. Diagnosed with cervical radiculopathy

## 2021-03-16 NOTE — DISCHARGE NOTE PROVIDER - NSDCFUADDINST_GEN_ALL_CORE_FT
No strenuous activity (bending/twisting), heavy lifting, driving or returning to work until cleared by MD.  You may shower. Remove dressing daily before shower, pat the area dry gently then reapply dry gauze dressing x 5 days, then leave incision open to air.   Try to have regular bowel movements, take stool softener or laxative if necessary.  May take pepcid or zantac for upset stomach.  Ice affected areas to decrease swelling.  Call to schedule an appt with Dr. June for follow up, if you have staples or sutures they will be removed in office.  Contact your doctor if you experience: fever greater than 101.5, chills, chest pain, difficulty breathing, redness or excessive drainage around the incision, other concerns.  No strenuous activity (bending/twisting), heavy lifting, driving or returning to work until cleared by MD.  You may shower. Remove dressing daily before shower, pat the area dry gently then reapply dry gauze dressing x 5 days, then leave incision open to air.   Try to have regular bowel movements, take stool softener or laxative if necessary.  Ice affected areas to decrease swelling.  Call to schedule an appt with Dr. June for follow up, if you have staples or sutures they will be removed in office.  Contact your doctor if you experience: fever greater than 101.5, chills, chest pain, difficulty breathing, redness or excessive drainage around the incision, other concerns.

## 2021-03-16 NOTE — OCCUPATIONAL THERAPY INITIAL EVALUATION ADULT - ADDITIONAL COMMENTS
Patient reports living in a elevator access apartment building with no ANABEL. Patient was independent with all ADL's and functional mobility with no AD prior to hospitalization.

## 2021-03-17 LAB
ANION GAP SERPL CALC-SCNC: 8 MMOL/L — SIGNIFICANT CHANGE UP (ref 5–17)
BUN SERPL-MCNC: 7 MG/DL — SIGNIFICANT CHANGE UP (ref 7–23)
CALCIUM SERPL-MCNC: 8.6 MG/DL — SIGNIFICANT CHANGE UP (ref 8.4–10.5)
CHLORIDE SERPL-SCNC: 105 MMOL/L — SIGNIFICANT CHANGE UP (ref 96–108)
CO2 SERPL-SCNC: 24 MMOL/L — SIGNIFICANT CHANGE UP (ref 22–31)
CREAT SERPL-MCNC: 0.73 MG/DL — SIGNIFICANT CHANGE UP (ref 0.5–1.3)
GLUCOSE SERPL-MCNC: 113 MG/DL — HIGH (ref 70–99)
HCT VFR BLD CALC: 37.5 % — SIGNIFICANT CHANGE UP (ref 34.5–45)
HGB BLD-MCNC: 11.6 G/DL — SIGNIFICANT CHANGE UP (ref 11.5–15.5)
MCHC RBC-ENTMCNC: 28.5 PG — SIGNIFICANT CHANGE UP (ref 27–34)
MCHC RBC-ENTMCNC: 30.9 GM/DL — LOW (ref 32–36)
MCV RBC AUTO: 92.1 FL — SIGNIFICANT CHANGE UP (ref 80–100)
NRBC # BLD: 0 /100 WBCS — SIGNIFICANT CHANGE UP (ref 0–0)
PLATELET # BLD AUTO: 316 K/UL — SIGNIFICANT CHANGE UP (ref 150–400)
POTASSIUM SERPL-MCNC: SIGNIFICANT CHANGE UP MMOL/L (ref 3.5–5.3)
POTASSIUM SERPL-SCNC: SIGNIFICANT CHANGE UP MMOL/L (ref 3.5–5.3)
RBC # BLD: 4.07 M/UL — SIGNIFICANT CHANGE UP (ref 3.8–5.2)
RBC # FLD: 15.2 % — HIGH (ref 10.3–14.5)
SODIUM SERPL-SCNC: 137 MMOL/L — SIGNIFICANT CHANGE UP (ref 135–145)
WBC # BLD: 11.56 K/UL — HIGH (ref 3.8–10.5)
WBC # FLD AUTO: 11.56 K/UL — HIGH (ref 3.8–10.5)

## 2021-03-17 RX ORDER — OXYCODONE HYDROCHLORIDE 5 MG/1
1 TABLET ORAL
Qty: 40 | Refills: 0
Start: 2021-03-17 | End: 2021-03-23

## 2021-03-17 RX ORDER — METHOCARBAMOL 500 MG/1
1 TABLET, FILM COATED ORAL
Qty: 15 | Refills: 0
Start: 2021-03-17 | End: 2021-03-21

## 2021-03-17 RX ORDER — TRAMADOL HYDROCHLORIDE 50 MG/1
1 TABLET ORAL
Qty: 0 | Refills: 0 | DISCHARGE

## 2021-03-17 RX ORDER — ACETAMINOPHEN 500 MG
2 TABLET ORAL
Qty: 0 | Refills: 0 | DISCHARGE
Start: 2021-03-17

## 2021-03-17 RX ADMIN — METHOCARBAMOL 500 MILLIGRAM(S): 500 TABLET, FILM COATED ORAL at 15:53

## 2021-03-17 RX ADMIN — OXYCODONE HYDROCHLORIDE 5 MILLIGRAM(S): 5 TABLET ORAL at 12:57

## 2021-03-17 RX ADMIN — METHOCARBAMOL 500 MILLIGRAM(S): 500 TABLET, FILM COATED ORAL at 06:00

## 2021-03-17 RX ADMIN — METHOCARBAMOL 500 MILLIGRAM(S): 500 TABLET, FILM COATED ORAL at 21:03

## 2021-03-17 RX ADMIN — BENZOCAINE AND MENTHOL 1 LOZENGE: 5; 1 LIQUID ORAL at 06:02

## 2021-03-17 RX ADMIN — Medication 50 MILLIGRAM(S): at 14:45

## 2021-03-17 RX ADMIN — Medication 1000 MILLIGRAM(S): at 22:03

## 2021-03-17 RX ADMIN — SENNA PLUS 2 TABLET(S): 8.6 TABLET ORAL at 21:03

## 2021-03-17 RX ADMIN — Medication 50 MILLIGRAM(S): at 05:59

## 2021-03-17 RX ADMIN — Medication 1000 MILLIGRAM(S): at 21:03

## 2021-03-17 RX ADMIN — OXYCODONE HYDROCHLORIDE 5 MILLIGRAM(S): 5 TABLET ORAL at 11:57

## 2021-03-17 RX ADMIN — Medication 1000 MILLIGRAM(S): at 06:59

## 2021-03-17 RX ADMIN — Medication 1000 MILLIGRAM(S): at 15:45

## 2021-03-17 RX ADMIN — Medication 1000 MILLIGRAM(S): at 14:45

## 2021-03-17 RX ADMIN — Medication 1000 MILLIGRAM(S): at 05:59

## 2021-03-17 RX ADMIN — Medication 101.6 MILLIGRAM(S): at 05:33

## 2021-03-17 RX ADMIN — Medication 50 MILLIGRAM(S): at 21:03

## 2021-03-17 NOTE — PROGRESS NOTE ADULT - SUBJECTIVE AND OBJECTIVE BOX
Procedure:  ACDF C5-C6 with ICBG  Surgeon: Dr. June     Pt comfortable, c/o some pain but dysphagia improved. Denies CP, SOB, N/V, numbness/tingling     Vital Signs Last 24 Hrs  T(C): 36.6 (17 Mar 2021 05:59), Max: 36.9 (16 Mar 2021 13:52)  T(F): 97.9 (17 Mar 2021 05:59), Max: 98.4 (16 Mar 2021 13:52)  HR: 73 (17 Mar 2021 05:59) (68 - 91)  BP: 133/78 (17 Mar 2021 05:59) (106/71 - 140/76)  BP(mean): --  RR: 16 (17 Mar 2021 05:59) (15 - 16)  SpO2: 99% (17 Mar 2021 05:59) (97% - 100%)    Dressing C/D/I; hard collar in place, JON x 1   General: Pt Alert and oriented   Sensation intact to bilateral UE distally. Motor Strength 5/5 to /interossei bilaterally. AIN/PIN/U motor intact.   Skin warm and well perfused   Radial pulses 2+         Labs:                    11.0   9.09  )-----------( 301      ( 16 Mar 2021 09:09 )             34.9     03-16    142  |  107  |  7   ----------------------------<  102<H>  4.4   |  26  |  0.78    Ca    8.7      16 Mar 2021 09:09          A/P: 41yFemale POD#2 s/p ACDF C5-C6, ICBG 3/15  - Stable  - Pain Control  - DVT ppx: SCDs  - DC drain  - PT, WBS: WBAT  - F/U AM Labs

## 2021-03-17 NOTE — PROGRESS NOTE ADULT - SUBJECTIVE AND OBJECTIVE BOX
Orthopaedic Surgery Progress Note    Post-operative day #2 s/p ACDF C5-6     Subjective:     Patient seen and examined. Patient comfortable without complaints, pain controlled. States she feels better than yesterday; dysphagia improved from yesterday.   Denies chest pain, shortness of breath, nausea/vomiting, numbness/tingling.    Objective:    Vital Signs Last 24 Hrs  T(C): 36.7 (03-17-21 @ 08:30), Max: 36.7 (03-17-21 @ 08:30)  T(F): 98 (03-17-21 @ 08:30), Max: 98 (03-17-21 @ 08:30)  HR: 62 (03-17-21 @ 08:30) (62 - 73)  BP: 118/77 (03-17-21 @ 08:30) (118/77 - 133/78)  BP(mean): --  RR: 17 (03-17-21 @ 08:30) (16 - 17)  SpO2: 100% (03-17-21 @ 08:30) (99% - 100%)  AVSS    PE:  General: Patient alert and oriented, NAD  Dressing: Clean/dry/intact neck, hard collar in place, JPx1 holding suction   Skin well perfused   Sensation: intact to bilateral upper extremities   Motor: firing biceps/triceps bilateral upper extremities                         11.0   9.09  )-----------( 301      ( 16 Mar 2021 09:09 )             34.9   03-17    137  |  105  |  7   ----------------------------<  113<H>  See note   |  24  |  0.73    Ca    8.6      17 Mar 2021 08:34        A/P: 41yFemale POD#1 s/p  ADCDF C5-6  1. Pain control as needed  2. DVT prophylaxis: SCDs  3. PT, weight-bearing status:  WBAT, cleared by physical therapy   4. Dispo: cleared by PT, will stay today for optimal pain control/ensure dysphagia resolved, home tomorrow     Ortho Pager [phone] Orthopaedic Surgery Progress Note    Post-operative day #2 s/p ACDF C5-6     Subjective:     Patient seen and examined. Patient comfortable without complaints, pain controlled. States she feels better than yesterday; dysphagia improved from yesterday.   Denies chest pain, shortness of breath, nausea/vomiting, numbness/tingling.    Objective:    Vital Signs Last 24 Hrs  T(C): 36.7 (03-17-21 @ 08:30), Max: 36.7 (03-17-21 @ 08:30)  T(F): 98 (03-17-21 @ 08:30), Max: 98 (03-17-21 @ 08:30)  HR: 62 (03-17-21 @ 08:30) (62 - 73)  BP: 118/77 (03-17-21 @ 08:30) (118/77 - 133/78)  BP(mean): --  RR: 17 (03-17-21 @ 08:30) (16 - 17)  SpO2: 100% (03-17-21 @ 08:30) (99% - 100%)  AVSS    PE:  General: Patient alert and oriented, NAD  Dressing: Clean/dry/intact neck, hard collar in place, JPx1 holding suction   Skin well perfused   Sensation: intact to bilateral upper extremities   Motor: firing biceps/triceps bilateral upper extremities                         11.0   9.09  )-----------( 301      ( 16 Mar 2021 09:09 )             34.9   03-17    137  |  105  |  7   ----------------------------<  113<H>  See note   |  24  |  0.73    Ca    8.6      17 Mar 2021 08:34        A/P: 41yFemale POD#1 s/p  ADCDF C5-6  1. Pain control as needed  2. DVT prophylaxis: SCDs  3. PT, weight-bearing status:  WBAT, cleared by physical therapy   4. Dispo: cleared by PT, will stay today for optimal pain control/ensure dysphagia resolved, home tomorrow     Addendum:  Drain d/c'd using aseptic technique. Patient tolerated well.     Ortho Pager [phone]

## 2021-03-17 NOTE — PROGRESS NOTE ADULT - ATTENDING COMMENTS
I have personally seen, examined and participated in the care of this patient.  I have reviewed all pertinent clinical information, including history, physical exam, plan and the resident's note.  Selina Montano is a 41 year old female s/p ACDF C5-C6.  Patient is neurologically intact.  Surgical site pain improved.  Remove drain today.  Mobilize with PT/OT.  [place]J at all times with exception of eating and cleaning.  Radiographs reviewed and hardware in appropriate position and alignment.  Dispo planning.

## 2021-03-18 ENCOUNTER — TRANSCRIPTION ENCOUNTER (OUTPATIENT)
Age: 41
End: 2021-03-18

## 2021-03-18 VITALS
RESPIRATION RATE: 17 BRPM | SYSTOLIC BLOOD PRESSURE: 124 MMHG | DIASTOLIC BLOOD PRESSURE: 79 MMHG | OXYGEN SATURATION: 98 % | TEMPERATURE: 98 F | HEART RATE: 84 BPM

## 2021-03-18 LAB
ANION GAP SERPL CALC-SCNC: 7 MMOL/L — SIGNIFICANT CHANGE UP (ref 5–17)
BUN SERPL-MCNC: 7 MG/DL — SIGNIFICANT CHANGE UP (ref 7–23)
CALCIUM SERPL-MCNC: 8.6 MG/DL — SIGNIFICANT CHANGE UP (ref 8.4–10.5)
CHLORIDE SERPL-SCNC: 105 MMOL/L — SIGNIFICANT CHANGE UP (ref 96–108)
CO2 SERPL-SCNC: 28 MMOL/L — SIGNIFICANT CHANGE UP (ref 22–31)
CREAT SERPL-MCNC: 0.86 MG/DL — SIGNIFICANT CHANGE UP (ref 0.5–1.3)
GLUCOSE SERPL-MCNC: 88 MG/DL — SIGNIFICANT CHANGE UP (ref 70–99)
HCT VFR BLD CALC: 34.5 % — SIGNIFICANT CHANGE UP (ref 34.5–45)
HGB BLD-MCNC: 11 G/DL — LOW (ref 11.5–15.5)
MCHC RBC-ENTMCNC: 28.5 PG — SIGNIFICANT CHANGE UP (ref 27–34)
MCHC RBC-ENTMCNC: 31.9 GM/DL — LOW (ref 32–36)
MCV RBC AUTO: 89.4 FL — SIGNIFICANT CHANGE UP (ref 80–100)
NRBC # BLD: 0 /100 WBCS — SIGNIFICANT CHANGE UP (ref 0–0)
PLATELET # BLD AUTO: 310 K/UL — SIGNIFICANT CHANGE UP (ref 150–400)
POTASSIUM SERPL-MCNC: 3.7 MMOL/L — SIGNIFICANT CHANGE UP (ref 3.5–5.3)
POTASSIUM SERPL-SCNC: 3.7 MMOL/L — SIGNIFICANT CHANGE UP (ref 3.5–5.3)
RBC # BLD: 3.86 M/UL — SIGNIFICANT CHANGE UP (ref 3.8–5.2)
RBC # FLD: 15.7 % — HIGH (ref 10.3–14.5)
SODIUM SERPL-SCNC: 140 MMOL/L — SIGNIFICANT CHANGE UP (ref 135–145)
WBC # BLD: 10.43 K/UL — SIGNIFICANT CHANGE UP (ref 3.8–10.5)
WBC # FLD AUTO: 10.43 K/UL — SIGNIFICANT CHANGE UP (ref 3.8–10.5)

## 2021-03-18 PROCEDURE — 86900 BLOOD TYPING SEROLOGIC ABO: CPT

## 2021-03-18 PROCEDURE — 72040 X-RAY EXAM NECK SPINE 2-3 VW: CPT

## 2021-03-18 PROCEDURE — 86850 RBC ANTIBODY SCREEN: CPT

## 2021-03-18 PROCEDURE — 86901 BLOOD TYPING SEROLOGIC RH(D): CPT

## 2021-03-18 PROCEDURE — C1889: CPT

## 2021-03-18 PROCEDURE — 85025 COMPLETE CBC W/AUTO DIFF WBC: CPT

## 2021-03-18 PROCEDURE — 36415 COLL VENOUS BLD VENIPUNCTURE: CPT

## 2021-03-18 PROCEDURE — C1713: CPT

## 2021-03-18 PROCEDURE — 85027 COMPLETE CBC AUTOMATED: CPT

## 2021-03-18 PROCEDURE — 76000 FLUOROSCOPY <1 HR PHYS/QHP: CPT

## 2021-03-18 PROCEDURE — 97161 PT EVAL LOW COMPLEX 20 MIN: CPT

## 2021-03-18 PROCEDURE — 80048 BASIC METABOLIC PNL TOTAL CA: CPT

## 2021-03-18 RX ADMIN — Medication 50 MILLIGRAM(S): at 05:13

## 2021-03-18 RX ADMIN — Medication 1000 MILLIGRAM(S): at 05:13

## 2021-03-18 RX ADMIN — Medication 1000 MILLIGRAM(S): at 06:13

## 2021-03-18 RX ADMIN — METHOCARBAMOL 500 MILLIGRAM(S): 500 TABLET, FILM COATED ORAL at 05:13

## 2021-03-18 NOTE — DISCHARGE NOTE NURSING/CASE MANAGEMENT/SOCIAL WORK - PATIENT PORTAL LINK FT
You can access the FollowMyHealth Patient Portal offered by [facility] by registering at the following website: [email or web address]. By joining [employer]’s FollowMyHealth portal, you will also be able to view your health information using other applications (apps) compatible with our system.

## 2021-03-18 NOTE — PROGRESS NOTE ADULT - SUBJECTIVE AND OBJECTIVE BOX
Procedure:  ACDF C5-C6 with ICBG  Surgeon: Dr. June     Pt comfortable, c/o some pain but dysphagia improved. Denies CP, SOB, N/V, numbness/tingling     Vital Signs Last 24 Hrs  T(C): 36.7 (18 Mar 2021 08:30), Max: 36.7 (17 Mar 2021 15:43)  T(F): 98 (18 Mar 2021 08:30), Max: 98 (17 Mar 2021 15:43)  HR: 84 (18 Mar 2021 08:30) (67 - 100)  BP: 124/79 (18 Mar 2021 08:30) (115/72 - 158/85)  BP(mean): --  RR: 17 (18 Mar 2021 08:30) (16 - 17)  SpO2: 98% (18 Mar 2021 08:30) (98% - 100%)    Dressing C/D/I; hard collar in place, no drains remaining  General: Pt Alert and oriented   Sensation intact to bilateral UE distally. Motor Strength 5/5 to /interossei bilaterally. AIN/PIN/U motor intact.   Skin warm and well perfused   Radial pulses 2+         Labs:                               11.0   10.43 )-----------( 310      ( 18 Mar 2021 07:32 )             34.5     03-18    140  |  105  |  7   ----------------------------<  88  3.7   |  28  |  0.86    Ca    8.6      18 Mar 2021 07:32          A/P: 41yFemale POD#3 s/p ACDF C5-C6, ICBG 3/15  - Stable  - Pain Control  - DVT ppx: SCDs  - PT, WBS: WBAT  - F/U AM Labs

## 2021-03-18 NOTE — PROGRESS NOTE ADULT - SUBJECTIVE AND OBJECTIVE BOX
Orthopaedic Surgery Progress Note    Post-operative day #3 s/p ACDF C5-6    Subjective:     Patient seen and examined. Patient comfortable without complaints, pain controlled. Much less pain with swallowing today, tolerating food well. Ready for discharge today.       Objective:    Vital Signs Last 24 Hrs  T(C): 36.7 (03-18-21 @ 08:30), Max: 36.7 (03-18-21 @ 05:13)  T(F): 98 (03-18-21 @ 08:30), Max: 98 (03-18-21 @ 05:13)  HR: 84 (03-18-21 @ 08:30) (67 - 84)  BP: 124/79 (03-18-21 @ 08:30) (123/73 - 124/79)  BP(mean): --  RR: 17 (03-18-21 @ 08:30) (16 - 17)  SpO2: 98% (03-18-21 @ 08:30) (98% - 100%)  AVSS    PE:  General: Patient alert and oriented, NAD  Dressing: Clean/dry/intact neck, hard collar in place  Resting comfortably, firing biceps/triceps bilateral upper extremities                             11.0   10.43 )-----------( 310      ( 18 Mar 2021 07:32 )             34.5   03-18    140  |  105  |  7   ----------------------------<  88  3.7   |  28  |  0.86    Ca    8.6      18 Mar 2021 07:32        A/P: 41yFemale POD#3 s/p ACDF C5-6  1. Pain control as needed  2. DVT prophylaxis: SCDs  3. PT, weight-bearing status: wbat, cleared by PT/PT  4. Dispo: home today  5. discussed with Dr. June, plan for d/c home today     Ortho Pager [phone]

## 2021-03-19 PROBLEM — E66.9 OBESITY, UNSPECIFIED: Chronic | Status: ACTIVE | Noted: 2021-03-12

## 2021-03-19 PROBLEM — M54.2 CERVICALGIA: Chronic | Status: ACTIVE | Noted: 2021-03-12

## 2021-03-19 PROBLEM — M54.10 RADICULOPATHY, SITE UNSPECIFIED: Chronic | Status: ACTIVE | Noted: 2021-03-12

## 2021-03-25 DIAGNOSIS — K21.9 GASTRO-ESOPHAGEAL REFLUX DISEASE WITHOUT ESOPHAGITIS: ICD-10-CM

## 2021-03-25 DIAGNOSIS — D64.9 ANEMIA, UNSPECIFIED: ICD-10-CM

## 2021-03-25 DIAGNOSIS — E66.9 OBESITY, UNSPECIFIED: ICD-10-CM

## 2021-03-25 DIAGNOSIS — R13.10 DYSPHAGIA, UNSPECIFIED: ICD-10-CM

## 2021-03-25 DIAGNOSIS — M50.122 CERVICAL DISC DISORDER AT C5-C6 LEVEL WITH RADICULOPATHY: ICD-10-CM

## 2021-03-25 DIAGNOSIS — Z98.890 OTHER SPECIFIED POSTPROCEDURAL STATES: ICD-10-CM

## 2021-03-25 DIAGNOSIS — M50.022 CERVICAL DISC DISORDER AT C5-C6 LEVEL WITH MYELOPATHY: ICD-10-CM

## 2021-03-29 ENCOUNTER — TRANSCRIPTION ENCOUNTER (OUTPATIENT)
Age: 41
End: 2021-03-29

## 2021-03-31 ENCOUNTER — APPOINTMENT (OUTPATIENT)
Dept: ORTHOPEDIC SURGERY | Facility: CLINIC | Age: 41
End: 2021-03-31
Payer: COMMERCIAL

## 2021-03-31 PROCEDURE — 72040 X-RAY EXAM NECK SPINE 2-3 VW: CPT

## 2021-03-31 PROCEDURE — 99024 POSTOP FOLLOW-UP VISIT: CPT

## 2021-03-31 RX ORDER — TIZANIDINE 4 MG/1
4 TABLET ORAL
Qty: 90 | Refills: 0 | Status: ACTIVE | COMMUNITY
Start: 2020-12-23

## 2021-03-31 RX ORDER — METHOCARBAMOL 500 MG/1
500 TABLET, FILM COATED ORAL
Qty: 15 | Refills: 0 | Status: ACTIVE | COMMUNITY
Start: 2021-03-17

## 2021-03-31 RX ORDER — FLUCONAZOLE 150 MG/1
150 TABLET ORAL
Qty: 1 | Refills: 0 | Status: ACTIVE | COMMUNITY
Start: 2020-11-18

## 2021-03-31 RX ORDER — MELOXICAM 15 MG/1
15 TABLET ORAL
Qty: 30 | Refills: 0 | Status: ACTIVE | COMMUNITY
Start: 2020-12-23

## 2021-03-31 RX ORDER — OXYCODONE 5 MG/1
5 TABLET ORAL
Qty: 40 | Refills: 0 | Status: ACTIVE | COMMUNITY
Start: 2021-03-17

## 2021-03-31 RX ORDER — ERGOCALCIFEROL 1.25 MG/1
1.25 MG CAPSULE, LIQUID FILLED ORAL
Qty: 4 | Refills: 0 | Status: ACTIVE | COMMUNITY
Start: 2020-12-28

## 2021-03-31 RX ORDER — TERCONAZOLE 80 MG/1
80 SUPPOSITORY VAGINAL
Qty: 3 | Refills: 0 | Status: ACTIVE | COMMUNITY
Start: 2020-11-12

## 2021-03-31 RX ORDER — TINIDAZOLE 500 MG/1
500 TABLET, FILM COATED ORAL
Qty: 8 | Refills: 0 | Status: ACTIVE | COMMUNITY
Start: 2020-11-18

## 2021-03-31 NOTE — HISTORY OF PRESENT ILLNESS
[___ Weeks Post Op] : [unfilled] weeks post op [de-identified] : s/p ACDF C5-C6 [de-identified] : 41 year old female s/p ACDF C5-C6 with iliac crest bone graft.  Patient reports resolution of radicular symptoms.  She reports good pain control.  She is only taking Tylenol for pain.  She has been using her cervical orthosis.  She is tolerating solid foods.   [de-identified] : MSK:\par Cervical Spine: \par Neck incision c/d/i\par Iliac crest incision c/d/i, sutures removed and steri-strips applied\par \par NEURO:\par Sensation \par          Left           \par C5     2/2               \par C6     2/2               \par C7     2/2               \par C8     2/2              \par T1     2/2             \par \par          Right         \par C5     2/2               \par C6     2/2               \par C7     2/2               \par C8     2/2              \par T1     2/2      \par \par Motor: \par                                                Left             \par C5 (deltoid abduction)             5/5               \par C6 (biceps flexion)                   5/5                \par C7 (triceps extension)             5/5               \par C8 (finger flexion)                     5/5               \par T1 (interosseous)                     5/5           \par \par                                                Right           \par C5 (deltoid abduction)             5/5               \par C6 (biceps flexion)                   5/5                \par C7 (triceps extension)             5/5               \par C8 (finger flexion)                     5/5               \par T1 (interosseous)                     5/5                     \par \par Sensation \par Left L2  -  2/2            \par Left L3  -  2/2\par Left L4  -  2/2\par Left L5  -  2/2\par Left S1  -  2/2\par \par Right L2  -  2/2            \par Right L3  -  2/2\par Right L4  -  2/2\par Right L5  -  2/2\par Right S1  -  2/2\par \par Motor: \par Left L2 (hip flexion)                            5/5                \par Left L3 (knee extension)                   5/5                \par Left L4 (ankle dorsiflexion)                 5/5                \par Left L5 (long toe extensor)                5/5                \par Left S1 (ankle plantar flexion)           5/5\par \par Right L2 (hip flexion)                            5/5                \par Right L3 (knee extension)                   5/5                \par Right L4 (ankle dorsiflexion)                 5/5                \par Right L5 (long toe extensor)                5/5                \par Right S1 (ankle plantar flexion)           5/5 [de-identified] : I ordered cervical AP and lateral radiographs to evaluate the patient post-op\par \par Cervical AP and lateral show s/p ACDF C5-C6 with hardware in appropriate position and alignment [de-identified] : 41 year old female 2 weeks s/p ACDF C5-C6 with iliac crest bone graft [de-identified] : Patient is recovering well.  She has resolution of her radicular symptoms.  She is only taking Tylenol for pain.  She is tolerating a regular diet.  Incisions healed.  She will followup in 4 weeks.  She will continue cervical collar (may remove when eating or cleaning).  She will continue to avoid lifting >10 lbs.  She knows to call with any questions or concerns or if her symptoms acutely worsen.

## 2021-04-28 ENCOUNTER — APPOINTMENT (OUTPATIENT)
Dept: ORTHOPEDIC SURGERY | Facility: CLINIC | Age: 41
End: 2021-04-28
Payer: COMMERCIAL

## 2021-04-28 PROCEDURE — 99024 POSTOP FOLLOW-UP VISIT: CPT

## 2021-04-28 PROCEDURE — 72040 X-RAY EXAM NECK SPINE 2-3 VW: CPT

## 2021-05-02 NOTE — HISTORY OF PRESENT ILLNESS
[___ Weeks Post Op] : [unfilled] weeks post op [de-identified] : s/p ACDF C5-C6 [de-identified] : 41 year old female s/p ACDF C5-C6 with iliac crest bone graft.  Patient reports resolution of radicular symptoms. She has been using her cervical orthosis.  She reports neck stiffness. [de-identified] : MSK:\par Cervical Spine: \par Neck incision well healed\par Iliac crest incision well healed\par \par NEURO:\par Sensation \par          Left           \par C5     2/2               \par C6     2/2               \par C7     2/2               \par C8     2/2              \par T1     2/2             \par \par          Right         \par C5     2/2               \par C6     2/2               \par C7     2/2               \par C8     2/2              \par T1     2/2      \par \par Motor: \par                                                Left             \par C5 (deltoid abduction)             5/5               \par C6 (biceps flexion)                   5/5                \par C7 (triceps extension)             5/5               \par C8 (finger flexion)                     5/5               \par T1 (interosseous)                     5/5           \par \par                                                Right           \par C5 (deltoid abduction)             5/5               \par C6 (biceps flexion)                   5/5                \par C7 (triceps extension)             5/5               \par C8 (finger flexion)                     5/5               \par T1 (interosseous)                     5/5                     \par \par Sensation \par Left L2  -  2/2            \par Left L3  -  2/2\par Left L4  -  2/2\par Left L5  -  2/2\par Left S1  -  2/2\par \par Right L2  -  2/2            \par Right L3  -  2/2\par Right L4  -  2/2\par Right L5  -  2/2\par Right S1  -  2/2\par \par Motor: \par Left L2 (hip flexion)                            5/5                \par Left L3 (knee extension)                   5/5                \par Left L4 (ankle dorsiflexion)                 5/5                \par Left L5 (long toe extensor)                5/5                \par Left S1 (ankle plantar flexion)           5/5\par \par Right L2 (hip flexion)                            5/5                \par Right L3 (knee extension)                   5/5                \par Right L4 (ankle dorsiflexion)                 5/5                \par Right L5 (long toe extensor)                5/5                \par Right S1 (ankle plantar flexion)           5/5 [de-identified] : I ordered cervical AP and lateral radiographs to evaluate the patient post-op\par \par Cervical AP and lateral show s/p ACDF C5-C6 with hardware in appropriate position and alignment [de-identified] : 41 year old female 6 weeks s/p ACDF C5-C6 with iliac crest bone graft [de-identified] : Patient is recovering well.  She has resolution of her radicular symptoms.  Incisions are well healed.  She is tolerating a regular diet. She reports some neck stiffness.  She can discontinue the hard cervical orthosis.  She will followup in 2 months.  We discussed red flag symptoms that would require emergent evaluation. She knows to call with any questions or concerns or if her symptoms acutely worsen.

## 2021-06-04 ENCOUNTER — APPOINTMENT (OUTPATIENT)
Dept: ORTHOPEDIC SURGERY | Facility: CLINIC | Age: 41
End: 2021-06-04

## 2021-08-04 RX ORDER — METHOCARBAMOL 750 MG/1
750 TABLET, FILM COATED ORAL 3 TIMES DAILY
Qty: 42 | Refills: 1 | Status: ACTIVE | COMMUNITY
Start: 2021-08-04 | End: 1900-01-01

## 2021-08-10 ENCOUNTER — APPOINTMENT (OUTPATIENT)
Dept: ORTHOPEDIC SURGERY | Facility: CLINIC | Age: 41
End: 2021-08-10
Payer: COMMERCIAL

## 2021-08-10 PROCEDURE — 99214 OFFICE O/P EST MOD 30 MIN: CPT

## 2021-08-10 PROCEDURE — 72050 X-RAY EXAM NECK SPINE 4/5VWS: CPT

## 2021-08-10 RX ORDER — METHYLPREDNISOLONE 4 MG/1
4 TABLET ORAL
Qty: 1 | Refills: 0 | Status: ACTIVE | COMMUNITY
Start: 2021-08-10 | End: 1900-01-01

## 2021-08-24 NOTE — PHYSICAL EXAM
[de-identified] : General: No acute distress, conversant, well-nourished.\par Head: Normocephalic, atraumatic\par Neck: trachea midline, FROM\par Heart: normotensive and normal rate and rhythm\par Lungs: No labored breathing\par Skin: No abrasions, no rashes, no edema\par Psych: Alert and oriented to person, place and time\par Extremities: no peripheral edema or digital cyanosis\par Gait: Normal gait. Can perform tandem gait.  \par Vascular: warm and well perfused distally, palpable distal pulses\par \par MSK:\par Cervical Spine: \par Incision well healed\par No tenderness to palpation.  No step-off, no deformity.\par \par NEURO:\par Sensation \par          Left           \par C5     2/2               \par C6     2/2               \par C7     2/2               \par C8     2/2              \par T1     2/2             \par \par          Right         \par C5     2/2               \par C6     2/2               \par C7     2/2               \par C8     2/2              \par T1     2/2      \par \par Motor: \par                                                Left             \par C5 (deltoid abduction)             5/5               \par C6 (biceps flexion)                   5/5                \par C7 (triceps extension)             5/5               \par C8 (finger flexion)                     5/5               \par T1 (interosseous)                     5/5           \par \par                                                Right           \par C5 (deltoid abduction)             5/5               \par C6 (biceps flexion)                   5/5                \par C7 (triceps extension)             5/5               \par C8 (finger flexion)                     5/5               \par T1 (interosseous)                     5/5                     \par \par Sensation \par Left L2  -  2/2            \par Left L3  -  2/2\par Left L4  -  2/2\par Left L5  -  2/2\par Left S1  -  2/2\par \par Right L2  -  2/2            \par Right L3  -  2/2\par Right L4  -  2/2\par Right L5  -  2/2\par Right S1  -  2/2\par \par Motor: \par Left L2 (hip flexion)                            5/5                \par Left L3 (knee extension)                   5/5                \par Left L4 (ankle dorsiflexion)                 5/5                \par Left L5 (long toe extensor)                5/5                \par Left S1 (ankle plantar flexion)           5/5\par \par Right L2 (hip flexion)                            5/5                \par Right L3 (knee extension)                   5/5                \par Right L4 (ankle dorsiflexion)                 5/5                \par Right L5 (long toe extensor)                5/5                \par Right S1 (ankle plantar flexion)           5/5\par \par Reflexes: Normal and symmetric\par Negative Spurling’s test.  Negative Victor’s reflex.  \par Negative clonus.  Down-going Babinski. [de-identified] : I ordered cervical radiographs to evaluate the patient's symptoms.\par \par Cervical 4 view radiographs obtained in the office today shows no fracture or dislocation.  s/p ACDF C5-C6 with hardware in appropriate position and alignment.  No instability on dynamic series.  \par \par Cervical MRI (2/17/21): Large extruded central disc herniation at C5-6 with moderate cord compression. Disc herniation is increased in size since prior study. Interval development of small central disc herniation at C4-5 which abuts the anterior cord margin. Small central disc herniation C6-7 is unchanged. Mild annular bulge at C3-4 is unchanged.\par \par Cervical AP, lateral, flexion and extension radiographs (Feb 10, 2021) no fracture or dislocation.  Cervical spondylosis.  Severe loss of disc height at C5-C6 with anterior osteophyte formation.  No instability on dynamic series. \par \par Cervical MRI (4/10/19): Central C5-6 disc herniation with extrusion resulting in moderate central stenosis. Central C7 disc herniation resulting in mild central stenosis.  Bulging C3-4 and C4-5 resulting in mild central stenosis. Evidence for muscle spasm with straightening and slight reversal of the normal cervical lordosis.

## 2021-08-24 NOTE — HISTORY OF PRESENT ILLNESS
[de-identified] : 41 year old female 5 months s/p ACDF C5-C6.  She presents today after an episode of neck pain.  She reports the pain has improved.  She denies radicular pain, recent illness, fevers, numbness, weakness, balance problems, saddle anesthesia, urinary retention or fecal incontinence. She is back to work.

## 2021-08-24 NOTE — ASSESSMENT
[FreeTextEntry1] : 41 year old female 5 months s/p ACDF C5-C6.  She presents today with an episode of increased neck pain.  It has started to improve. She has no radicular symptoms and is neurologically intact.  She was given a steroid dose pack.  She will followup in 1-2 weeks.  We discussed red flag symptoms that would require emergent evaluation.\par She knows to call with any questions or concerns or if her symptoms acutely worsen.

## 2021-09-16 RX ORDER — DICLOFENAC SODIUM 75 MG/1
75 TABLET, DELAYED RELEASE ORAL
Qty: 60 | Refills: 1 | Status: ACTIVE | COMMUNITY
Start: 2021-09-16 | End: 1900-01-01

## 2021-09-17 ENCOUNTER — APPOINTMENT (OUTPATIENT)
Dept: ORTHOPEDIC SURGERY | Facility: CLINIC | Age: 41
End: 2021-09-17
Payer: COMMERCIAL

## 2021-09-17 PROCEDURE — 99214 OFFICE O/P EST MOD 30 MIN: CPT | Mod: 25

## 2021-09-17 PROCEDURE — 73562 X-RAY EXAM OF KNEE 3: CPT | Mod: LT

## 2021-09-17 PROCEDURE — 20610 DRAIN/INJ JOINT/BURSA W/O US: CPT | Mod: LT

## 2021-09-17 NOTE — PROCEDURE
[de-identified] : Procedure: Left Knee Joint injection with corticosteroid\par Pre-Procedure Diagnosis: Left knee pain\par Post-Procedure Diagnosis: same\par \par The patient was educated about the risks and benefits of a corticosteroid injection.  Alternatives were discussed.  The patient understood and consented for the procedure.\par \par The area was sterilely prepped using isopropyl alcohol.  An ethyl chloride spray provided  local anesthesia.  Using the usual sterile technique, 1 ml of 40mg/1ml of Kenalog and 4 ml of Lidocaine 1% without epinephrine was injected into the joint.  A dressing was applied to the area.  The patient tolerated the procedure well and without complication.\par

## 2021-09-17 NOTE — HISTORY OF PRESENT ILLNESS
[de-identified] : 41 year old female 6 months s/p ACDF C5-C6 presents with new orthopedic complaint of left knee pain. She says the pain is worse with walking.  She has a new job and has been more active which has caused increased left knee pain.  She has known arthritis. She denies radicular pain, recent illness, fevers, numbness, weakness, balance problems, saddle anesthesia, urinary retention or fecal incontinence.

## 2021-09-17 NOTE — ASSESSMENT
[FreeTextEntry1] : 41 year old female s/p ACDF presents with new orthopedic complaint of left knee pain.  She says the pain is worse with activity.  She has tried exercise without relief.  She has tried OTC medications without relief.  Her radiographs show arthritis.  She was given a left knee steroid injection which she tolerated well.  She was given a PT referral.  She will followup in 3 months. We discussed red flag symptoms that would require emergent evaluation. She knows to call with any questions or concerns or if her symptoms acutely worsen.

## 2021-09-17 NOTE — PHYSICAL EXAM
[de-identified] : General: No acute distress, conversant, well-nourished.\par Head: Normocephalic, atraumatic\par Neck: trachea midline, FROM\par Heart: normotensive and normal rate and rhythm\par Lungs: No labored breathing\par Skin: No abrasions, no rashes, no edema\par Psych: Alert and oriented to person, place and time\par Extremities: no peripheral edema or digital cyanosis\par Gait: Normal gait. Can perform tandem gait.  \par Vascular: warm and well perfused distally, palpable distal pulses\par \par MSK:\par Left Knee with no erythema, no effusion.  \par No tenderness to palpation of knee.\par No medial joint line tenderness.\par No lateral joint line tenderness.\par \par Range of motion: 0 - 130 degrees\par +Pain with range of motion.\par \par Negative Sidney's test.\par Stable to varus and valgus stress.\par Negative Lachman's test, negative anterior and posterior drawer tests.  \par \par Sensation intact to light touch.  \par Normal motor exam.\par Warm and well perfused distally.\par \par Cervical Spine: \par Incision well healed\par No tenderness to palpation.  No step-off, no deformity.\par \par NEURO:\par Sensation \par          Left           \par C5     2/2               \par C6     2/2               \par C7     2/2               \par C8     2/2              \par T1     2/2             \par \par          Right         \par C5     2/2               \par C6     2/2               \par C7     2/2               \par C8     2/2              \par T1     2/2      \par \par Motor: \par                                                Left             \par C5 (deltoid abduction)             5/5               \par C6 (biceps flexion)                   5/5                \par C7 (triceps extension)             5/5               \par C8 (finger flexion)                     5/5               \par T1 (interosseous)                     5/5           \par \par                                                Right           \par C5 (deltoid abduction)             5/5               \par C6 (biceps flexion)                   5/5                \par C7 (triceps extension)             5/5               \par C8 (finger flexion)                     5/5               \par T1 (interosseous)                     5/5                     \par \par Sensation \par Left L2  -  2/2            \par Left L3  -  2/2\par Left L4  -  2/2\par Left L5  -  2/2\par Left S1  -  2/2\par \par Right L2  -  2/2            \par Right L3  -  2/2\par Right L4  -  2/2\par Right L5  -  2/2\par Right S1  -  2/2\par \par Motor: \par Left L2 (hip flexion)                            5/5                \par Left L3 (knee extension)                   5/5                \par Left L4 (ankle dorsiflexion)                 5/5                \par Left L5 (long toe extensor)                5/5                \par Left S1 (ankle plantar flexion)           5/5\par \par Right L2 (hip flexion)                            5/5                \par Right L3 (knee extension)                   5/5                \par Right L4 (ankle dorsiflexion)                 5/5                \par Right L5 (long toe extensor)                5/5                \par Right S1 (ankle plantar flexion)           5/5\par \par Reflexes: Normal and symmetric\par Negative Spurling’s test.  Negative Victor’s reflex.  \par Negative clonus.  Down-going Babinski. [de-identified] : I ordered left knee radiographs to evaluate the patient's symptoms.\par \par Left knee 3 view radiographs obtained in the office today shows no fracture or dislocation. Degenerative osteoarthritis most pronounced in medial compartment.\par \par Cervical 4 view radiographs (8/10/21) no fracture or dislocation.  s/p ACDF C5-C6 with hardware in appropriate position and alignment.  No instability on dynamic series.  \par \par Cervical MRI (2/17/21): Large extruded central disc herniation at C5-6 with moderate cord compression. Disc herniation is increased in size since prior study. Interval development of small central disc herniation at C4-5 which abuts the anterior cord margin. Small central disc herniation C6-7 is unchanged. Mild annular bulge at C3-4 is unchanged.\par \par Cervical AP, lateral, flexion and extension radiographs (Feb 10, 2021) no fracture or dislocation.  Cervical spondylosis.  Severe loss of disc height at C5-C6 with anterior osteophyte formation.  No instability on dynamic series. \par \par Cervical MRI (4/10/19): Central C5-6 disc herniation with extrusion resulting in moderate central stenosis. Central C7 disc herniation resulting in mild central stenosis.  Bulging C3-4 and C4-5 resulting in mild central stenosis. Evidence for muscle spasm with straightening and slight reversal of the normal cervical lordosis.

## 2021-12-21 ENCOUNTER — APPOINTMENT (OUTPATIENT)
Dept: ORTHOPEDIC SURGERY | Facility: CLINIC | Age: 41
End: 2021-12-21
Payer: COMMERCIAL

## 2021-12-21 ENCOUNTER — APPOINTMENT (OUTPATIENT)
Dept: PHYSICAL MEDICINE AND REHAB | Facility: CLINIC | Age: 41
End: 2021-12-21
Payer: COMMERCIAL

## 2021-12-21 DIAGNOSIS — M51.26 OTHER INTERVERTEBRAL DISC DISPLACEMENT, LUMBAR REGION: ICD-10-CM

## 2021-12-21 PROCEDURE — 20552 NJX 1/MLT TRIGGER POINT 1/2: CPT

## 2021-12-21 PROCEDURE — 72100 X-RAY EXAM L-S SPINE 2/3 VWS: CPT

## 2021-12-21 PROCEDURE — 20610 DRAIN/INJ JOINT/BURSA W/O US: CPT | Mod: LT

## 2021-12-21 PROCEDURE — 99214 OFFICE O/P EST MOD 30 MIN: CPT | Mod: 25

## 2021-12-21 PROCEDURE — 99204 OFFICE O/P NEW MOD 45 MIN: CPT | Mod: 25

## 2021-12-21 RX ORDER — MELOXICAM 15 MG/1
15 TABLET ORAL
Qty: 90 | Refills: 0 | Status: ACTIVE | COMMUNITY
Start: 2021-12-21 | End: 1900-01-01

## 2021-12-21 RX ORDER — PREGABALIN 50 MG/1
50 CAPSULE ORAL
Qty: 21 | Refills: 0 | Status: DISCONTINUED | COMMUNITY
Start: 2021-03-17 | End: 2021-12-21

## 2021-12-21 RX ORDER — GABAPENTIN ENACARBIL 300 MG/1
300 TABLET, EXTENDED RELEASE ORAL
Qty: 60 | Refills: 0 | Status: ACTIVE | COMMUNITY
Start: 2021-12-21 | End: 1900-01-01

## 2021-12-21 NOTE — PHYSICAL EXAM
[FreeTextEntry1] : KAREN is a 41 year female \par Constitutional: healthy appearing, NAD, and normal body habitus\par \par LUMBAR\par ROM: flexion to\par \par Gait: normal\par \par Inspection: no erythema, warmth\par Spine: no TTP in spinous process\par Bony palpation: no TTP in GT\par \par Soft tissue palpation hip: no TTP in gluteus arnol\par Soft tissue palpation of spine: no TTP in lumbar paraspinals\par \par 5/5 bilateral KE, DF, PF \par sensation intact in bilat LE\par reflexes: knee and ankle \par \par Special tests: neg seated SLR\par \par

## 2021-12-21 NOTE — PROCEDURE
[de-identified] : Procedure: Left Knee Joint injection with corticosteroid\par Pre-Procedure Diagnosis: Left knee pain\par Post-Procedure Diagnosis: same\par \par The patient was educated about the risks and benefits of a corticosteroid injection.  Alternatives were discussed.  The patient understood and consented for the procedure.\par \par The area was sterilely prepped using isopropyl alcohol.  An ethyl chloride spray provided  local anesthesia.  Using the usual sterile technique, 1 ml of 40mg/1ml of Kenalog and 4 ml of Lidocaine 1% without epinephrine was injected into the joint.  A dressing was applied to the area.  The patient tolerated the procedure well and without complication.\par

## 2021-12-21 NOTE — PROCEDURE
[de-identified] : Indication: myofascial pain\par \par After informed consent,she elected to proceed with a trigger point injection into the lumbar paraspinals. I confirmed no prior adverse reactions, no active infections, and no relevant allergies. \par  \par The skin was prepped in the usual sterile manner.  The sites were injected with Lidocaine followed by local needling. The injection was completed without complication and a bandage was applied. She tolerated the procedure well and was given post-injection instructions. \par  \par Cold Tx x 48 hours, analgesics prn. Medications: 0.5 ml of 1% Lidocaine per site\par \par Exp 5/2023\par Manufacture: [employer]\par NDC 3938-2569-16\par [id number]\par

## 2021-12-21 NOTE — DATA REVIEWED
[FreeTextEntry1] : Office x-rays of the lumbar spine AP lateral show mild curve an AP, mild facet arthritis and L5-S1. No gross fractures.

## 2021-12-21 NOTE — ASSESSMENT
[FreeTextEntry1] : 41 year old female 9 months s/p ACDF C5-C6.  She is also here for followup for acute exacerbation of left knee pain.  She says she had about 3 months of relief with her left knee corticosteroid injection but the pain has returned.  She was given a new left knee corticosteroid injection today which she tolerated well.  She has no radicular pain and is neurologically intact.  She will continue HEP for her left knee.  She will take Meloxicam as needed.  She will followup in 3 months for her cervical spine.  She will followup as needed for her left knee.  We discussed red flag symptoms that would require emergent evaluation. She knows to call with any questions or concerns or if her symptoms acutely worsen.

## 2021-12-21 NOTE — PHYSICAL EXAM
[de-identified] : General: No acute distress, conversant, well-nourished.\par Head: Normocephalic, atraumatic\par Neck: trachea midline, FROM\par Heart: normotensive and normal rate and rhythm\par Lungs: No labored breathing\par Skin: No abrasions, no rashes, no edema\par Psych: Alert and oriented to person, place and time\par Extremities: no peripheral edema or digital cyanosis\par Gait: Normal gait. Can perform tandem gait.  \par Vascular: warm and well perfused distally, palpable distal pulses\par \par MSK:\par Left Knee with no erythema, no effusion.  \par No tenderness to palpation of knee.\par No medial joint line tenderness.\par No lateral joint line tenderness.\par \par Range of motion: 0 - 130 degrees\par +Pain with range of motion.\par \par Negative Sidney's test.\par Stable to varus and valgus stress.\par Negative Lachman's test, negative anterior and posterior drawer tests.  \par \par Sensation intact to light touch.  \par Normal motor exam.\par Warm and well perfused distally.\par \par Cervical Spine: \par Incision well healed\par No tenderness to palpation.  No step-off, no deformity.\par \par NEURO:\par Sensation \par          Left           \par C5     2/2               \par C6     2/2               \par C7     2/2               \par C8     2/2              \par T1     2/2             \par \par          Right         \par C5     2/2               \par C6     2/2               \par C7     2/2               \par C8     2/2              \par T1     2/2      \par \par Motor: \par                                                Left             \par C5 (deltoid abduction)             5/5               \par C6 (biceps flexion)                   5/5                \par C7 (triceps extension)             5/5               \par C8 (finger flexion)                     5/5               \par T1 (interosseous)                     5/5           \par \par                                                Right           \par C5 (deltoid abduction)             5/5               \par C6 (biceps flexion)                   5/5                \par C7 (triceps extension)             5/5               \par C8 (finger flexion)                     5/5               \par T1 (interosseous)                     5/5                     \par \par Sensation \par Left L2  -  2/2            \par Left L3  -  2/2\par Left L4  -  2/2\par Left L5  -  2/2\par Left S1  -  2/2\par \par Right L2  -  2/2            \par Right L3  -  2/2\par Right L4  -  2/2\par Right L5  -  2/2\par Right S1  -  2/2\par \par Motor: \par Left L2 (hip flexion)                            5/5                \par Left L3 (knee extension)                   5/5                \par Left L4 (ankle dorsiflexion)                 5/5                \par Left L5 (long toe extensor)                5/5                \par Left S1 (ankle plantar flexion)           5/5\par \par Right L2 (hip flexion)                            5/5                \par Right L3 (knee extension)                   5/5                \par Right L4 (ankle dorsiflexion)                 5/5                \par Right L5 (long toe extensor)                5/5                \par Right S1 (ankle plantar flexion)           5/5\par \par Reflexes: Normal and symmetric\par Negative Spurling’s test.  Negative Victor’s reflex.  \par Negative clonus.  Down-going Babinski. [de-identified] : Left knee 3 view radiographs (9/17/21) no fracture or dislocation. Degenerative osteoarthritis most pronounced in medial compartment.\par \par Cervical 4 view radiographs (8/10/21) no fracture or dislocation.  s/p ACDF C5-C6 with hardware in appropriate position and alignment.  No instability on dynamic series.  \par \par Cervical MRI (2/17/21): Large extruded central disc herniation at C5-6 with moderate cord compression. Disc herniation is increased in size since prior study. Interval development of small central disc herniation at C4-5 which abuts the anterior cord margin. Small central disc herniation C6-7 is unchanged. Mild annular bulge at C3-4 is unchanged.\par \par Cervical AP, lateral, flexion and extension radiographs (Feb 10, 2021) no fracture or dislocation.  Cervical spondylosis.  Severe loss of disc height at C5-C6 with anterior osteophyte formation.  No instability on dynamic series. \par \par Cervical MRI (4/10/19): Central C5-6 disc herniation with extrusion resulting in moderate central stenosis. Central C7 disc herniation resulting in mild central stenosis.  Bulging C3-4 and C4-5 resulting in mild central stenosis. Evidence for muscle spasm with straightening and slight reversal of the normal cervical lordosis.

## 2021-12-21 NOTE — REVIEW OF SYSTEMS
[Anxiety] : anxiety [Negative] : Heme/Lymph [Depression] : no depression [de-identified] : numbness

## 2021-12-21 NOTE — ASSESSMENT
[FreeTextEntry1] : Discussed diagnosis and treatment plan including PT.\par Avoid back flexion.  Limit sitting.  Ice back often.  Lift things properly.  Massage back with tennis ball.\par Only take 1 nsaid.\par Educated to lose weight.\par \par She has been doing HEP so needs MRI to plan BRANDON which helped significantly for 2 yr in the past. \par \par f/u 1 month

## 2021-12-21 NOTE — HISTORY OF PRESENT ILLNESS
[de-identified] : 41 year old female 9 months s/p ACDF C5-C6.  She is also here for followup for acute exacerbation of left knee pain.  She says she had about 3 months of relief with her left knee corticosteroid injection but the pain has returned.  She would like a new injection. She denies radicular pain, recent illness, fevers, numbness, weakness, balance problems, saddle anesthesia, urinary retention or fecal incontinence.

## 2021-12-21 NOTE — HISTORY OF PRESENT ILLNESS
[FreeTextEntry1] : Location: back\par Severity: 4/10\par Duration: over 2 yr\par Timing: chronic\par Aggravating Factors: \par Alleviating Factors: advil, heat\par Associated Symptoms: denies weight loss, fever, chills, change in bowel/bladder habits, redness, warmth, weakness, numbness/tingling, radiation down leg\par

## 2022-03-24 ENCOUNTER — APPOINTMENT (OUTPATIENT)
Dept: ORTHOPEDIC SURGERY | Facility: CLINIC | Age: 42
End: 2022-03-24
Payer: COMMERCIAL

## 2022-03-24 DIAGNOSIS — M17.10 UNILATERAL PRIMARY OSTEOARTHRITIS, UNSPECIFIED KNEE: ICD-10-CM

## 2022-03-24 PROCEDURE — 99214 OFFICE O/P EST MOD 30 MIN: CPT | Mod: 25

## 2022-03-24 PROCEDURE — 73562 X-RAY EXAM OF KNEE 3: CPT | Mod: RT

## 2022-03-24 PROCEDURE — 20610 DRAIN/INJ JOINT/BURSA W/O US: CPT | Mod: 50

## 2022-03-27 PROBLEM — M17.10 ARTHRITIS OF KNEE: Status: ACTIVE | Noted: 2020-09-30

## 2022-03-27 NOTE — PHYSICAL EXAM
[de-identified] : General: No acute distress, conversant, well-nourished.\par Head: Normocephalic, atraumatic\par Neck: trachea midline, FROM\par Heart: normotensive and normal rate and rhythm\par Lungs: No labored breathing\par Skin: No abrasions, no rashes, no edema\par Psych: Alert and oriented to person, place and time\par Extremities: no peripheral edema or digital cyanosis\par Gait: Normal gait. Can perform tandem gait.  \par Vascular: warm and well perfused distally, palpable distal pulses\par \par MSK:\par Bilateral Knee with no erythema, no effusion.  \par No tenderness to palpation of knee.\par No medial joint line tenderness.\par No lateral joint line tenderness.\par \par Range of motion: 0 - 130 degrees\par +Pain with range of motion.\par \par Negative Sidney's test.\par Stable to varus and valgus stress.\par Negative Lachman's test, negative anterior and posterior drawer tests.  \par \par Sensation intact to light touch.  \par Normal motor exam.\par Warm and well perfused distally.\par \par Cervical Spine: \par Incision well healed\par No tenderness to palpation.  No step-off, no deformity.\par \par NEURO:\par Sensation \par          Left           \par C5     2/2               \par C6     2/2               \par C7     2/2               \par C8     2/2              \par T1     2/2             \par \par          Right         \par C5     2/2               \par C6     2/2               \par C7     2/2               \par C8     2/2              \par T1     2/2      \par \par Motor: \par                                                Left             \par C5 (deltoid abduction)             5/5               \par C6 (biceps flexion)                   5/5                \par C7 (triceps extension)             5/5               \par C8 (finger flexion)                     5/5               \par T1 (interosseous)                     5/5           \par \par                                                Right           \par C5 (deltoid abduction)             5/5               \par C6 (biceps flexion)                   5/5                \par C7 (triceps extension)             5/5               \par C8 (finger flexion)                     5/5               \par T1 (interosseous)                     5/5                     \par \par Sensation \par Left L2  -  2/2            \par Left L3  -  2/2\par Left L4  -  2/2\par Left L5  -  2/2\par Left S1  -  2/2\par \par Right L2  -  2/2            \par Right L3  -  2/2\par Right L4  -  2/2\par Right L5  -  2/2\par Right S1  -  2/2\par \par Motor: \par Left L2 (hip flexion)                            5/5                \par Left L3 (knee extension)                   5/5                \par Left L4 (ankle dorsiflexion)                 5/5                \par Left L5 (long toe extensor)                5/5                \par Left S1 (ankle plantar flexion)           5/5\par \par Right L2 (hip flexion)                            5/5                \par Right L3 (knee extension)                   5/5                \par Right L4 (ankle dorsiflexion)                 5/5                \par Right L5 (long toe extensor)                5/5                \par Right S1 (ankle plantar flexion)           5/5\par \par Reflexes: Normal and symmetric\par Negative Spurling’s test.  Negative Victor’s reflex.  \par Negative clonus.  Down-going Babinski. [de-identified] : I ordered radiographs to evaluate the patient's symptoms.\par \par Right knee 3 view radiographs obtained in the office today shows no fracture or dislocation.  Degenerative osteoarthritis most pronounced in medial compartment\par \par Left knee 3 view radiographs (9/17/21) no fracture or dislocation. Degenerative osteoarthritis most pronounced in medial compartment.\par \par Cervical 4 view radiographs (8/10/21) no fracture or dislocation.  s/p ACDF C5-C6 with hardware in appropriate position and alignment.  No instability on dynamic series.  \par \par Cervical MRI (2/17/21): Large extruded central disc herniation at C5-6 with moderate cord compression. Disc herniation is increased in size since prior study. Interval development of small central disc herniation at C4-5 which abuts the anterior cord margin. Small central disc herniation C6-7 is unchanged. Mild annular bulge at C3-4 is unchanged.\par \par Cervical AP, lateral, flexion and extension radiographs (Feb 10, 2021) no fracture or dislocation.  Cervical spondylosis.  Severe loss of disc height at C5-C6 with anterior osteophyte formation.  No instability on dynamic series. \par \par Cervical MRI (4/10/19): Central C5-6 disc herniation with extrusion resulting in moderate central stenosis. Central C7 disc herniation resulting in mild central stenosis.  Bulging C3-4 and C4-5 resulting in mild central stenosis. Evidence for muscle spasm with straightening and slight reversal of the normal cervical lordosis.

## 2022-03-27 NOTE — REASON FOR VISIT
[Follow-Up Visit] : a follow-up visit for [Neck Pain] : neck pain [FreeTextEntry2] : Neck pain has improved not having any pain. Today she has new complaint of RIGHT knee pain.

## 2022-03-27 NOTE — ASSESSMENT
[FreeTextEntry1] : 41 year old female 1 year s/p ACDF C5-C6.  She is also here for followup for acute exacerbation of left knee pain.  She also reports new issue of right knee pain.She has no neck pain.  She has no radicular pain and is neurologically intact. She was given bilateral knee corticosteroid injections which she tolerated well.  She will continue her HEP.  She can continue diclofenac as needed.  She will followup in 3 months.  We discussed red flag symptoms that would require emergent evaluation. She knows to call with any questions or concerns or if her symptoms acutely worsen.

## 2022-03-27 NOTE — PROCEDURE
[de-identified] : Procedure: Right knee Joint injection with corticosteroid\par Pre-Procedure Diagnosis: Right knee pain\par Post-Procedure Diagnosis: same\par \par The patient was educated about the risks and benefits of a corticosteroid injection.  Alternatives were discussed.  The patient understood and consented for the procedure.\par \par The area was sterilely prepped using isopropyl alcohol.  An ethyl chloride spray provided  local anesthesia.  Using the usual sterile technique, 1 ml of 40mg/1ml of Kenalog and 4 ml of Lidocaine 1% without epinephrine was injected into the joint.  A dressing was applied to the area.  The patient tolerated the procedure well and without complication.\par \par Procedure: Left Knee Joint injection with corticosteroid\par Pre-Procedure Diagnosis: Left knee pain\par Post-Procedure Diagnosis: same\par \par The patient was educated about the risks and benefits of a corticosteroid injection.  Alternatives were discussed.  The patient understood and consented for the procedure.\par \par The area was sterilely prepped using isopropyl alcohol.  An ethyl chloride spray provided  local anesthesia.  Using the usual sterile technique, 1 ml of 40mg/1ml of Kenalog and 4 ml of Lidocaine 1% without epinephrine was injected into the joint.  A dressing was applied to the area.  The patient tolerated the procedure well and without complication.\par

## 2022-03-27 NOTE — HISTORY OF PRESENT ILLNESS
[de-identified] : 41 year old female 1 year s/p ACDF C5-C6.  She is also here for followup for acute exacerbation of left knee pain.  She also reports new issue of right knee pain.  She says she had about 3 months of relief with her left knee corticosteroid injection but the pain has returned.  She would like a new injection. She denies radicular pain, recent illness, fevers, numbness, weakness, balance problems, saddle anesthesia, urinary retention or fecal incontinence.

## 2023-03-08 RX ORDER — MELOXICAM 15 MG/1
15 TABLET ORAL
Qty: 30 | Refills: 2 | Status: ACTIVE | COMMUNITY
Start: 2023-03-08 | End: 1900-01-01

## 2023-03-14 ENCOUNTER — APPOINTMENT (OUTPATIENT)
Dept: ORTHOPEDIC SURGERY | Facility: CLINIC | Age: 43
End: 2023-03-14
Payer: COMMERCIAL

## 2023-03-14 DIAGNOSIS — M25.561 PAIN IN RIGHT KNEE: ICD-10-CM

## 2023-03-14 DIAGNOSIS — M25.562 PAIN IN LEFT KNEE: ICD-10-CM

## 2023-03-14 PROCEDURE — 73562 X-RAY EXAM OF KNEE 3: CPT | Mod: 50

## 2023-03-14 PROCEDURE — 20610 DRAIN/INJ JOINT/BURSA W/O US: CPT | Mod: 50

## 2023-03-14 PROCEDURE — 72050 X-RAY EXAM NECK SPINE 4/5VWS: CPT

## 2023-03-14 PROCEDURE — 99214 OFFICE O/P EST MOD 30 MIN: CPT | Mod: 25

## 2023-03-14 RX ORDER — GABAPENTIN 300 MG/1
300 CAPSULE ORAL 3 TIMES DAILY
Qty: 90 | Refills: 0 | Status: ACTIVE | COMMUNITY
Start: 2023-03-14 | End: 1900-01-01

## 2023-03-14 RX ORDER — TIZANIDINE 2 MG/1
2 TABLET ORAL
Qty: 40 | Refills: 1 | Status: ACTIVE | COMMUNITY
Start: 2023-03-14 | End: 1900-01-01

## 2023-03-14 RX ORDER — METHYLPREDNISOLONE 4 MG/1
4 TABLET ORAL
Qty: 1 | Refills: 0 | Status: ACTIVE | COMMUNITY
Start: 2023-03-14 | End: 1900-01-01

## 2023-03-18 PROBLEM — M25.562 LEFT KNEE PAIN: Status: ACTIVE | Noted: 2021-09-17

## 2023-03-18 PROBLEM — M25.561 RIGHT KNEE PAIN: Status: ACTIVE | Noted: 2022-03-27

## 2023-03-18 NOTE — HISTORY OF PRESENT ILLNESS
[de-identified] : 43 year old female s/p ACDF C5-C6 (3/15/21).  She is also here for followup for acute exacerbation of bilateral knee pain.  She had good relief with steroid injections in the past and would like new injections for her knees. She denies radicular pain, recent illness, fevers, numbness, weakness, balance problems, saddle anesthesia, urinary retention or fecal incontinence.

## 2023-03-18 NOTE — ASSESSMENT
[FreeTextEntry1] : 43 year old female s/p ACDF C5-C6 (3/15/21).  She is also here for followup for acute exacerbation of bilateral knee pain.. She denies radicular pain. She is neurologically intact.  She was given bilateral knee steroid injections which she tolerated well.  She was given gabapentin, tizanidine and a steroid dose pack.  The patient was given a referral for physical therapy. She will followup in 4 weeks. We discussed red flag symptoms that would require emergent evaluation. She knows to call with any questions or concerns or if her symptoms acutely worsen.

## 2023-03-18 NOTE — PROCEDURE
[de-identified] : Procedure: Right knee Joint injection with corticosteroid\par Pre-Procedure Diagnosis: Right knee pain\par Post-Procedure Diagnosis: same\par \par The patient was educated about the risks and benefits of a corticosteroid injection.  Alternatives were discussed.  The patient understood and consented for the procedure.\par \par The area was sterilely prepped using isopropyl alcohol.  An ethyl chloride spray provided  local anesthesia.  Using the usual sterile technique, 1 ml of 40mg/1ml of Kenalog and 4 ml of Lidocaine 1% without epinephrine was injected into the joint.  A dressing was applied to the area.  The patient tolerated the procedure well and without complication.\par \par Procedure: Left Knee Joint injection with corticosteroid\par Pre-Procedure Diagnosis: Left knee pain\par Post-Procedure Diagnosis: same\par \par The patient was educated about the risks and benefits of a corticosteroid injection.  Alternatives were discussed.  The patient understood and consented for the procedure.\par \par The area was sterilely prepped using isopropyl alcohol.  An ethyl chloride spray provided  local anesthesia.  Using the usual sterile technique, 1 ml of 40mg/1ml of Kenalog and 4 ml of Lidocaine 1% without epinephrine was injected into the joint.  A dressing was applied to the area.  The patient tolerated the procedure well and without complication.\par

## 2023-03-18 NOTE — PHYSICAL EXAM
[de-identified] : General: No acute distress, conversant, well-nourished.\par Head: Normocephalic, atraumatic\par Neck: trachea midline, FROM\par Heart: normotensive and normal rate and rhythm\par Lungs: No labored breathing\par Skin: No abrasions, no rashes, no edema\par Psych: Alert and oriented to person, place and time\par Extremities: no peripheral edema or digital cyanosis\par Gait: Normal gait. Can perform tandem gait.  \par Vascular: warm and well perfused distally, palpable distal pulses\par \par MSK:\par Bilateral Knee with no erythema, no effusion.  \par No tenderness to palpation of knee.\par No medial joint line tenderness.\par No lateral joint line tenderness.\par \par Range of motion: 0 - 130 degrees\par +Pain with range of motion.\par \par Negative Sidney's test.\par Stable to varus and valgus stress.\par Negative Lachman's test, negative anterior and posterior drawer tests.  \par \par Sensation intact to light touch.  \par Normal motor exam.\par Warm and well perfused distally.\par \par Cervical Spine: \par Incision well healed\par No tenderness to palpation.  No step-off, no deformity.\par \par NEURO:\par Sensation \par          Left           \par C5     2/2               \par C6     2/2               \par C7     2/2               \par C8     2/2              \par T1     2/2             \par \par          Right         \par C5     2/2               \par C6     2/2               \par C7     2/2               \par C8     2/2              \par T1     2/2      \par \par Motor: \par                                                Left             \par C5 (deltoid abduction)             5/5               \par C6 (biceps flexion)                   5/5                \par C7 (triceps extension)             5/5               \par C8 (finger flexion)                     5/5               \par T1 (interosseous)                     5/5           \par \par                                                Right           \par C5 (deltoid abduction)             5/5               \par C6 (biceps flexion)                   5/5                \par C7 (triceps extension)             5/5               \par C8 (finger flexion)                     5/5               \par T1 (interosseous)                     5/5                     \par \par Sensation \par Left L2  -  2/2            \par Left L3  -  2/2\par Left L4  -  2/2\par Left L5  -  2/2\par Left S1  -  2/2\par \par Right L2  -  2/2            \par Right L3  -  2/2\par Right L4  -  2/2\par Right L5  -  2/2\par Right S1  -  2/2\par \par Motor: \par Left L2 (hip flexion)                            5/5                \par Left L3 (knee extension)                   5/5                \par Left L4 (ankle dorsiflexion)                 5/5                \par Left L5 (long toe extensor)                5/5                \par Left S1 (ankle plantar flexion)           5/5\par \par Right L2 (hip flexion)                            5/5                \par Right L3 (knee extension)                   5/5                \par Right L4 (ankle dorsiflexion)                 5/5                \par Right L5 (long toe extensor)                5/5                \par Right S1 (ankle plantar flexion)           5/5\par \par Reflexes: Normal and symmetric\par Negative Spurling’s test.  Negative Victor’s reflex.  \par Negative clonus.  Down-going Babinski. [de-identified] : I ordered radiographs to evaluate the patient's symptoms.\par \par Bilateral knee 3 view radiographs obtained in the office today shows no fracture or dislocation.  Degenerative osteoarthritis mpartment.\par \par Cervical 4 view radiographs obtained in the office today shows no fracture or dislocation..  s/p ACDF C5-C6 with hardware in appropriate position and alignment.  No instability on dynamic series.  \par \par Cervical MRI (2/17/21): Large extruded central disc herniation at C5-6 with moderate cord compression. Disc herniation is increased in size since prior study. Interval development of small central disc herniation at C4-5 which abuts the anterior cord margin. Small central disc herniation C6-7 is unchanged. Mild annular bulge at C3-4 is unchanged.\par \par Cervical AP, lateral, flexion and extension radiographs (Feb 10, 2021) no fracture or dislocation.  Cervical spondylosis.  Severe loss of disc height at C5-C6 with anterior osteophyte formation.  No instability on dynamic series. \par \par Cervical MRI (4/10/19): Central C5-6 disc herniation with extrusion resulting in moderate central stenosis. Central C7 disc herniation resulting in mild central stenosis.  Bulging C3-4 and C4-5 resulting in mild central stenosis. Evidence for muscle spasm with straightening and slight reversal of the normal cervical lordosis.

## 2023-04-25 ENCOUNTER — APPOINTMENT (OUTPATIENT)
Dept: ORTHOPEDIC SURGERY | Facility: CLINIC | Age: 43
End: 2023-04-25

## 2023-07-25 ENCOUNTER — APPOINTMENT (OUTPATIENT)
Dept: ORTHOPEDIC SURGERY | Facility: CLINIC | Age: 43
End: 2023-07-25
Payer: COMMERCIAL

## 2023-07-25 DIAGNOSIS — M54.12 RADICULOPATHY, CERVICAL REGION: ICD-10-CM

## 2023-07-25 DIAGNOSIS — M54.16 RADICULOPATHY, LUMBAR REGION: ICD-10-CM

## 2023-07-25 DIAGNOSIS — M54.2 CERVICALGIA: ICD-10-CM

## 2023-07-25 DIAGNOSIS — G95.20 UNSPECIFIED CORD COMPRESSION: ICD-10-CM

## 2023-07-25 DIAGNOSIS — G89.29 CERVICALGIA: ICD-10-CM

## 2023-07-25 DIAGNOSIS — M79.18 MYALGIA, OTHER SITE: ICD-10-CM

## 2023-07-25 PROCEDURE — 99214 OFFICE O/P EST MOD 30 MIN: CPT

## 2023-07-25 PROCEDURE — 72050 X-RAY EXAM NECK SPINE 4/5VWS: CPT

## 2023-07-25 PROCEDURE — 72110 X-RAY EXAM L-2 SPINE 4/>VWS: CPT

## 2023-07-25 RX ORDER — METHYLPREDNISOLONE 4 MG/1
4 TABLET ORAL
Qty: 1 | Refills: 0 | Status: ACTIVE | COMMUNITY
Start: 2023-07-25 | End: 1900-01-01

## 2023-07-25 RX ORDER — METHOCARBAMOL 750 MG/1
750 TABLET, FILM COATED ORAL 3 TIMES DAILY
Qty: 42 | Refills: 1 | Status: ACTIVE | COMMUNITY
Start: 2023-07-25 | End: 1900-01-01

## 2023-07-25 NOTE — REASON FOR VISIT
[Follow-Up Visit] : a follow-up visit for [Back Pain] : back pain [Neck Pain] : neck pain [Other: ____] : [unfilled]

## 2023-07-26 PROBLEM — G95.20 CERVICAL SPINAL CORD COMPRESSION: Status: ACTIVE | Noted: 2021-02-18

## 2023-07-26 PROBLEM — M54.16 LUMBAR RADICULITIS: Status: ACTIVE | Noted: 2021-12-21

## 2023-07-26 PROBLEM — M79.18 MYOFASCIAL PAIN: Status: ACTIVE | Noted: 2021-12-21

## 2023-07-26 PROBLEM — M54.12 CERVICAL RADICULOPATHY: Status: ACTIVE | Noted: 2021-02-10

## 2023-07-26 PROBLEM — M54.2 CHRONIC NECK PAIN: Status: ACTIVE | Noted: 2021-02-10

## 2023-07-26 NOTE — HISTORY OF PRESENT ILLNESS
[de-identified] : 43 year old female s/p ACDF C5-C6 (3/15/21).  She returns today with increased neck and low back pain.  Her knee pain remains improved after injections. She denies radicular pain, recent illness, fevers, numbness, weakness, balance problems, saddle anesthesia, urinary retention or fecal incontinence.

## 2023-07-26 NOTE — ASSESSMENT
[FreeTextEntry1] : 43 year old female s/p ACDF C5-C6 (3/15/21).  She returns today with increased neck and low back pain.  Her knee pain remains improved after injections. She is at her neurologic baseline. She will be sent for cervical and lumbar MRIs.  She was given a steroid dose pack and methocarbamol. The patient was given a referral for physical therapy. She will followup after her MRIs. We discussed red flag symptoms that would require emergent evaluation. She knows to call with any questions or concerns or if her symptoms acutely worsen.

## 2023-07-26 NOTE — PHYSICAL EXAM
[de-identified] : General: No acute distress, conversant, well-nourished.\par Head: Normocephalic, atraumatic\par Neck: trachea midline, FROM\par Heart: normotensive and normal rate and rhythm\par Lungs: No labored breathing\par Skin: No abrasions, no rashes, no edema\par Psych: Alert and oriented to person, place and time\par Extremities: no peripheral edema or digital cyanosis\par Gait: Normal gait. Can perform tandem gait.  \par Vascular: warm and well perfused distally, palpable distal pulses\par \par MSK:\par Bilateral Knee with no erythema, no effusion.  \par No tenderness to palpation of knee.\par No medial joint line tenderness.\par No lateral joint line tenderness.\par \par Range of motion: 0 - 130 degrees\par No Pain with range of motion.\par \par Negative Sidney's test.\par Stable to varus and valgus stress.\par Negative Lachman's test, negative anterior and posterior drawer tests.  \par \par Sensation intact to light touch.  \par Normal motor exam.\par Warm and well perfused distally.\par \par Cervical Spine: \par Incision well healed\par No tenderness to palpation.  No step-off, no deformity.\par \par NEURO:\par Sensation \par          Left           \par C5     2/2               \par C6     2/2               \par C7     2/2               \par C8     2/2              \par T1     2/2             \par \par          Right         \par C5     2/2               \par C6     2/2               \par C7     2/2               \par C8     2/2              \par T1     2/2      \par \par Motor: \par                                                Left             \par C5 (deltoid abduction)             5/5               \par C6 (biceps flexion)                   5/5                \par C7 (triceps extension)             5/5               \par C8 (finger flexion)                     5/5               \par T1 (interosseous)                     5/5           \par \par                                                Right           \par C5 (deltoid abduction)             5/5               \par C6 (biceps flexion)                   5/5                \par C7 (triceps extension)             5/5               \par C8 (finger flexion)                     5/5               \par T1 (interosseous)                     5/5                     \par \par Sensation \par Left L2  -  2/2            \par Left L3  -  2/2\par Left L4  -  2/2\par Left L5  -  2/2\par Left S1  -  2/2\par \par Right L2  -  2/2            \par Right L3  -  2/2\par Right L4  -  2/2\par Right L5  -  2/2\par Right S1  -  2/2\par \par Motor: \par Left L2 (hip flexion)                            5/5                \par Left L3 (knee extension)                   5/5                \par Left L4 (ankle dorsiflexion)                 5/5                \par Left L5 (long toe extensor)                5/5                \par Left S1 (ankle plantar flexion)           5/5\par \par Right L2 (hip flexion)                            5/5                \par Right L3 (knee extension)                   5/5                \par Right L4 (ankle dorsiflexion)                 5/5                \par Right L5 (long toe extensor)                5/5                \par Right S1 (ankle plantar flexion)           5/5\par \par Reflexes: Normal and symmetric\par Negative Spurling’s test.  Negative Victor’s reflex.  \par Negative clonus.  Down-going Babinski. [de-identified] : I ordered radiographs to evaluate the patient's symptoms.\par \par Cervical 4 view radiographs obtained in the office today shows no fracture or dislocation..  s/p ACDF C5-C6 with hardware in appropriate position and alignment.  No instability on dynamic series.  \par \par Lumbar 4 view radiographs taken in the office today show no dislocation or fracture.  Lumbar spondylosis.  No instability on dynamic series.\par \par Cervical MRI (2/17/21): Large extruded central disc herniation at C5-6 with moderate cord compression. Disc herniation is increased in size since prior study. Interval development of small central disc herniation at C4-5 which abuts the anterior cord margin. Small central disc herniation C6-7 is unchanged. Mild annular bulge at C3-4 is unchanged.\par \par Cervical AP, lateral, flexion and extension radiographs (Feb 10, 2021) no fracture or dislocation.  Cervical spondylosis.  Severe loss of disc height at C5-C6 with anterior osteophyte formation.  No instability on dynamic series. \par \par Cervical MRI (4/10/19): Central C5-6 disc herniation with extrusion resulting in moderate central stenosis. Central C7 disc herniation resulting in mild central stenosis.  Bulging C3-4 and C4-5 resulting in mild central stenosis. Evidence for muscle spasm with straightening and slight reversal of the normal cervical lordosis.
